# Patient Record
Sex: MALE | Race: WHITE | NOT HISPANIC OR LATINO | Employment: OTHER | URBAN - METROPOLITAN AREA
[De-identification: names, ages, dates, MRNs, and addresses within clinical notes are randomized per-mention and may not be internally consistent; named-entity substitution may affect disease eponyms.]

---

## 2023-03-30 DIAGNOSIS — M10.9 GOUT, UNSPECIFIED CAUSE, UNSPECIFIED CHRONICITY, UNSPECIFIED SITE: Primary | ICD-10-CM

## 2023-03-30 RX ORDER — ALLOPURINOL 100 MG/1
100 TABLET ORAL DAILY
Qty: 90 TABLET | Refills: 2 | Status: SHIPPED | OUTPATIENT
Start: 2023-03-30 | End: 2023-12-26

## 2023-08-04 ENCOUNTER — OFFICE VISIT (OUTPATIENT)
Dept: PRIMARY CARE | Facility: CLINIC | Age: 68
End: 2023-08-04
Payer: MEDICARE

## 2023-08-04 VITALS
DIASTOLIC BLOOD PRESSURE: 105 MMHG | HEIGHT: 70 IN | HEART RATE: 87 BPM | WEIGHT: 213 LBS | SYSTOLIC BLOOD PRESSURE: 177 MMHG | BODY MASS INDEX: 30.49 KG/M2 | OXYGEN SATURATION: 93 %

## 2023-08-04 DIAGNOSIS — R05.3 CHRONIC COUGH: ICD-10-CM

## 2023-08-04 DIAGNOSIS — I10 HTN (HYPERTENSION), BENIGN: Primary | ICD-10-CM

## 2023-08-04 PROCEDURE — 99213 OFFICE O/P EST LOW 20 MIN: CPT | Performed by: FAMILY MEDICINE

## 2023-08-04 PROCEDURE — 3080F DIAST BP >= 90 MM HG: CPT | Performed by: FAMILY MEDICINE

## 2023-08-04 PROCEDURE — 3077F SYST BP >= 140 MM HG: CPT | Performed by: FAMILY MEDICINE

## 2023-08-04 PROCEDURE — 1157F ADVNC CARE PLAN IN RCRD: CPT | Performed by: FAMILY MEDICINE

## 2023-08-04 RX ORDER — BENZONATATE 100 MG/1
100 CAPSULE ORAL 3 TIMES DAILY PRN
Qty: 42 CAPSULE | Refills: 0 | Status: SHIPPED | OUTPATIENT
Start: 2023-08-04 | End: 2023-08-15 | Stop reason: SDUPTHER

## 2023-08-04 RX ORDER — POLYMYXIN B SULFATE AND TRIMETHOPRIM 1; 10000 MG/ML; [USP'U]/ML
SOLUTION OPHTHALMIC
COMMUNITY
Start: 2023-03-06 | End: 2023-12-19 | Stop reason: WASHOUT

## 2023-08-04 RX ORDER — LEVOFLOXACIN 500 MG/1
TABLET, FILM COATED ORAL
COMMUNITY
Start: 2022-12-13 | End: 2023-12-19 | Stop reason: WASHOUT

## 2023-08-04 RX ORDER — ASPIRIN 81 MG/1
1 TABLET ORAL DAILY
COMMUNITY

## 2023-08-04 RX ORDER — MULTIVIT-MIN/IRON/FOLIC ACID/K 18-600-40
CAPSULE ORAL
COMMUNITY

## 2023-08-04 RX ORDER — PREDNISOLONE ACETATE 10 MG/ML
SUSPENSION/ DROPS OPHTHALMIC
COMMUNITY
Start: 2021-10-08 | End: 2023-12-19 | Stop reason: WASHOUT

## 2023-08-04 RX ORDER — FLUTICASONE PROPIONATE AND SALMETEROL 250; 50 UG/1; UG/1
1 POWDER RESPIRATORY (INHALATION) EVERY 12 HOURS
COMMUNITY
Start: 2022-12-13 | End: 2023-12-19 | Stop reason: WASHOUT

## 2023-08-04 RX ORDER — LISINOPRIL AND HYDROCHLOROTHIAZIDE 12.5; 2 MG/1; MG/1
1 TABLET ORAL DAILY
COMMUNITY
Start: 2022-03-11 | End: 2023-08-04 | Stop reason: SINTOL

## 2023-08-04 RX ORDER — VALSARTAN AND HYDROCHLOROTHIAZIDE 80; 12.5 MG/1; MG/1
1 TABLET, FILM COATED ORAL DAILY
Qty: 90 TABLET | Refills: 1 | Status: SHIPPED | OUTPATIENT
Start: 2023-08-04 | End: 2024-01-16

## 2023-08-04 RX ORDER — ATORVASTATIN CALCIUM 40 MG/1
1 TABLET, FILM COATED ORAL NIGHTLY
COMMUNITY
Start: 2020-11-27 | End: 2024-05-29

## 2023-08-04 ASSESSMENT — ENCOUNTER SYMPTOMS
SHORTNESS OF BREATH: 1
RHINORRHEA: 1
CHILLS: 0
COUGH: 1

## 2023-08-04 NOTE — PROGRESS NOTES
Answers submitted by the patient for this visit:  Cough Questionnaire (Submitted on 8/4/2023)  Chief Complaint: Cough  Chronicity: new  Onset: more than 1 month ago  Progression since onset: waxing and waning  Frequency: every few hours  Cough characteristics: productive of sputum  chest pain: No  chills: No  ear congestion: No  ear pain: Yes  postnasal drip: Yes  rash: Yes  rhinorrhea: Yes  shortness of breath: Yes  Aggravated by: nothing

## 2023-08-04 NOTE — PROGRESS NOTES
Subjective   Patient ID: Easton Ragland is a 68 y.o. male.    Cough  This is a new problem. The current episode started more than 1 month ago. The problem has been waxing and waning. The problem occurs every few hours. The cough is Productive of sputum. Associated symptoms include ear pain, postnasal drip, a rash, rhinorrhea and shortness of breath. Pertinent negatives include no chest pain, chills or ear congestion. Nothing aggravates the symptoms.     Cough congestion patient has chronic recurrent cough no fever no chills no evidence of recent infection.  Might be due to his lisinopril we will going to change medications around little bit see if that helps with his cough also needs to be worked up for gastroesophageal reflux  Review of Systems   Constitutional:  Negative for chills.   HENT:  Positive for ear pain, postnasal drip and rhinorrhea.    Respiratory:  Positive for cough and shortness of breath.    Cardiovascular:  Negative for chest pain.   Skin:  Positive for rash.   All other systems reviewed and are negative.      Objective   Physical Exam  General no acute process no icterus well-hydrated alert active oriented    HEENT normocephalic no palpable tenderness eyes pupils equal reactive light and accommodation extraocular muscles intact no icterus and/or erythema ears benign external auditory canal no gross deformities nose no discharge drainage erythema bleeding throat no erythema.    Heart regular rate and rhythm without S3-S4 or murmur    Lungs clear to auscultation x2 no rales or rhonchi    Abdomen soft nontender nondistended no palpable masses no organomegaly splenomegaly.    Integument no rash no lumps bumps or concerning lesions.    Neurologic no tics tremors or seizures no decreased range of motion or ataxia.    Musculoskeletal good range of motion no gross abnormalities noted  Assessment/Plan   There are no diagnoses linked to this encounter.

## 2023-08-08 DIAGNOSIS — R05.3 CHRONIC COUGH: ICD-10-CM

## 2023-08-11 ASSESSMENT — ENCOUNTER SYMPTOMS
COUGH: 1
SHORTNESS OF BREATH: 1
RHINORRHEA: 1
CHILLS: 0

## 2023-08-15 RX ORDER — BENZONATATE 100 MG/1
100 CAPSULE ORAL 3 TIMES DAILY PRN
Qty: 42 CAPSULE | Refills: 0 | Status: SHIPPED | OUTPATIENT
Start: 2023-08-15 | End: 2023-09-14

## 2023-08-31 PROBLEM — I73.9 PERIPHERAL VASCULAR DISEASE (CMS-HCC): Status: ACTIVE | Noted: 2023-08-31

## 2023-08-31 PROBLEM — I42.0 CARDIOMYOPATHY, DILATED (MULTI): Status: ACTIVE | Noted: 2023-08-31

## 2023-08-31 PROBLEM — M17.9 OSTEOARTHRITIS OF KNEE: Status: ACTIVE | Noted: 2023-08-31

## 2023-08-31 PROBLEM — G80.9 CEREBRAL PALSY (MULTI): Status: ACTIVE | Noted: 2023-08-31

## 2023-08-31 PROBLEM — I25.10 CAD (CORONARY ARTERY DISEASE): Status: ACTIVE | Noted: 2023-08-31

## 2023-08-31 PROBLEM — E78.00 HYPERCHOLESTEROLEMIA: Status: ACTIVE | Noted: 2023-08-31

## 2023-08-31 PROBLEM — I10 BENIGN ESSENTIAL HTN: Status: ACTIVE | Noted: 2023-08-31

## 2023-08-31 PROBLEM — I65.29 STENOSIS OF CAROTID ARTERY: Status: ACTIVE | Noted: 2023-08-31

## 2023-08-31 PROBLEM — F17.200 SMOKES TOBACCO DAILY: Status: ACTIVE | Noted: 2023-08-31

## 2023-08-31 PROBLEM — I82.409 DVT (DEEP VENOUS THROMBOSIS) (MULTI): Status: ACTIVE | Noted: 2023-08-31

## 2023-08-31 PROBLEM — H90.3 ASYMMETRICAL SENSORINEURAL HEARING LOSS: Status: ACTIVE | Noted: 2021-08-10

## 2023-08-31 PROBLEM — R06.02 SOB (SHORTNESS OF BREATH) ON EXERTION: Status: ACTIVE | Noted: 2023-08-31

## 2023-08-31 RX ORDER — LISINOPRIL AND HYDROCHLOROTHIAZIDE 12.5; 2 MG/1; MG/1
TABLET ORAL
COMMUNITY
Start: 2023-05-07 | End: 2023-12-19 | Stop reason: WASHOUT

## 2023-10-09 ENCOUNTER — HOSPITAL ENCOUNTER (OUTPATIENT)
Dept: CARDIOLOGY | Facility: CLINIC | Age: 68
Discharge: HOME | End: 2023-10-09
Payer: MEDICARE

## 2023-10-09 ENCOUNTER — APPOINTMENT (OUTPATIENT)
Dept: RADIOLOGY | Facility: CLINIC | Age: 68
End: 2023-10-09
Payer: MEDICARE

## 2023-10-09 ENCOUNTER — ANCILLARY PROCEDURE (OUTPATIENT)
Dept: RADIOLOGY | Facility: CLINIC | Age: 68
End: 2023-10-09
Payer: MEDICARE

## 2023-10-09 DIAGNOSIS — I25.10 CAD (CORONARY ARTERY DISEASE): Primary | ICD-10-CM

## 2023-10-09 DIAGNOSIS — I25.10 ATHEROSCLEROTIC HEART DISEASE OF NATIVE CORONARY ARTERY WITHOUT ANGINA PECTORIS: Primary | ICD-10-CM

## 2023-10-09 DIAGNOSIS — I42.0 DILATED CARDIOMYOPATHY (MULTI): ICD-10-CM

## 2023-10-09 DIAGNOSIS — I25.10 ATHEROSCLEROTIC HEART DISEASE OF NATIVE CORONARY ARTERY WITHOUT ANGINA PECTORIS: ICD-10-CM

## 2023-10-09 DIAGNOSIS — I10 ESSENTIAL (PRIMARY) HYPERTENSION: ICD-10-CM

## 2023-10-09 PROCEDURE — 93306 TTE W/DOPPLER COMPLETE: CPT

## 2023-10-09 PROCEDURE — 78451 HT MUSCLE IMAGE SPECT SING: CPT

## 2023-10-09 PROCEDURE — 93017 CV STRESS TEST TRACING ONLY: CPT

## 2023-10-09 PROCEDURE — 78452 HT MUSCLE IMAGE SPECT MULT: CPT | Performed by: INTERNAL MEDICINE

## 2023-10-09 PROCEDURE — 93306 TTE W/DOPPLER COMPLETE: CPT | Performed by: INTERNAL MEDICINE

## 2023-10-09 PROCEDURE — 93016 CV STRESS TEST SUPVJ ONLY: CPT | Performed by: INTERNAL MEDICINE

## 2023-10-11 LAB — EJECTION FRACTION APICAL 4 CHAMBER: 64.8

## 2023-10-23 ENCOUNTER — OFFICE VISIT (OUTPATIENT)
Dept: OTOLARYNGOLOGY | Facility: CLINIC | Age: 68
End: 2023-10-23
Payer: MEDICARE

## 2023-10-23 VITALS
DIASTOLIC BLOOD PRESSURE: 88 MMHG | HEART RATE: 72 BPM | BODY MASS INDEX: 28.35 KG/M2 | WEIGHT: 198 LBS | SYSTOLIC BLOOD PRESSURE: 148 MMHG | HEIGHT: 70 IN | TEMPERATURE: 98 F

## 2023-10-23 DIAGNOSIS — J02.9 SORE THROAT: Primary | ICD-10-CM

## 2023-10-23 DIAGNOSIS — J34.89 NASAL DRAINAGE: ICD-10-CM

## 2023-10-23 DIAGNOSIS — K21.9 LARYNGOPHARYNGEAL REFLUX (LPR): ICD-10-CM

## 2023-10-23 DIAGNOSIS — H91.90 HEARING LOSS, UNSPECIFIED HEARING LOSS TYPE, UNSPECIFIED LATERALITY: ICD-10-CM

## 2023-10-23 PROCEDURE — 1159F MED LIST DOCD IN RCRD: CPT | Performed by: NURSE PRACTITIONER

## 2023-10-23 PROCEDURE — 3079F DIAST BP 80-89 MM HG: CPT | Performed by: NURSE PRACTITIONER

## 2023-10-23 PROCEDURE — 3077F SYST BP >= 140 MM HG: CPT | Performed by: NURSE PRACTITIONER

## 2023-10-23 PROCEDURE — 99213 OFFICE O/P EST LOW 20 MIN: CPT | Performed by: NURSE PRACTITIONER

## 2023-10-23 PROCEDURE — 1126F AMNT PAIN NOTED NONE PRSNT: CPT | Performed by: NURSE PRACTITIONER

## 2023-10-23 ASSESSMENT — COLUMBIA-SUICIDE SEVERITY RATING SCALE - C-SSRS
6. HAVE YOU EVER DONE ANYTHING, STARTED TO DO ANYTHING, OR PREPARED TO DO ANYTHING TO END YOUR LIFE?: NO
2. HAVE YOU ACTUALLY HAD ANY THOUGHTS OF KILLING YOURSELF?: NO
1. IN THE PAST MONTH, HAVE YOU WISHED YOU WERE DEAD OR WISHED YOU COULD GO TO SLEEP AND NOT WAKE UP?: NO

## 2023-10-23 ASSESSMENT — PATIENT HEALTH QUESTIONNAIRE - PHQ9
1. LITTLE INTEREST OR PLEASURE IN DOING THINGS: NOT AT ALL
SUM OF ALL RESPONSES TO PHQ9 QUESTIONS 1 AND 2: 0
2. FEELING DOWN, DEPRESSED OR HOPELESS: NOT AT ALL

## 2023-10-23 ASSESSMENT — ENCOUNTER SYMPTOMS
LOSS OF SENSATION IN FEET: 0
OCCASIONAL FEELINGS OF UNSTEADINESS: 0
DEPRESSION: 0

## 2023-10-23 ASSESSMENT — PAIN SCALES - GENERAL: PAINLEVEL: 0-NO PAIN

## 2023-10-23 NOTE — PROGRESS NOTES
"Subjective   Patient ID: Easton Ragland is a 68 y.o. male who presents for Sore Throat (Follow up ).    HPI    INITIAL VISIT 9/5/2023:  Easton Ragland is a 68 year-old male here for evaluation of his throat. He feels he has a \"hairball\" in his throat that started about 1 week ago. When he would clear his throat it would be sore. He used Chloraseptic. Symptoms have got better over the last few days. It's worse at night. He has a history of chronic sinus disease, has had surgeries in the past. He has allergies which he takes Mucinex and nasal sprays. - hasn't used it in a couple weeks prior. Now he is using his sinus medications. Has not had a sinus infection in years.   Dry cough for a couple of months, on Lisinopril and switched to Valsartan, does feel cough has improved some. Taking benzonatate as well. No acid reflux or heartburn. His voice is raspy, worse throughout the day. No trouble swallowing. Tea helps. Cigar smoker, cut way back- maybe one every 2 months.     10/23/2023: Patient following up for his throat. Feels medication has worked. Hard to take it before biggest meal, wasn't using it consistently after the first couple of weeks. He does feel the throat is much improved. No complaints today.  Has allergies, tested years ago. Takes Zyrtec daily- wondering if there is a better antihistamine. Uses Flonase most days. Uses sinus rinses if nasal drainage becomes discolored- admits to using tap water.   Had an episode where the hearing in his left ear was decreased for about 1 week. Resolved now. Does have hearing loss, last test was ~ 3 years ago through [facility]. Tried hearing aids but doesn't feel they work well.     Past Medical History:   Diagnosis Date    Personal history of other diseases of the circulatory system     History of coronary atherosclerosis    Personal history of other diseases of the musculoskeletal system and connective tissue     History of gout     Past Surgical History:   Procedure Laterality " Date    OTHER SURGICAL HISTORY  12/06/2019    Tonsillectomy with adenoidectomy    OTHER SURGICAL HISTORY  12/06/2019    Knee replacement     Review of Systems    All other systems have been reviewed and are negative for complaints except for those mentioned in history of present illness, past medical history and problem list.    Objective   Physical Exam    Constitutional: No fever, chills, weight loss or weight gain  General appearance: Appears well, well-nourished, well groomed. No acute distress.    Communication: Normal communication    Psychiatric: Oriented to person, place and time. Normal mood and affect.    Neurologic: Cranial nerves II-XII grossly intact and symmetric bilaterally.    Head and Face:  Head: Atraumatic with no masses, lesions or scarring.  Face: Normal symmetry. No scars or deformities.  TMJ: Normal, no trismus.    Eyes: Conjunctiva not edematous or erythematous.     Right Ear: External inspection of ear with no deformity, scars, or masses. EAC is clear.  TM is intact with no sign of infection, effusion, or retraction.  No perforation seen.     Left Ear: External inspection of ear with no deformity, scars, or masses. EAC is clear.  TM is intact with no sign of infection, effusion, or retraction.  No perforation seen.     Nose: External inspection of nose: No nasal lesions, lacerations or scars. Anterior rhinoscopy with limited visualization past the inferior turbinates. No tenderness on frontal or maxillary sinus palpation.    Oral Cavity/Mouth: Oral cavity and oropharynx mucosa moist and pink. No lesions or masses. Dentition normal. Tonsils appear normal. Uvula is midline. Tongue with no masses or lesions. Tongue with good mobility. The oropharynx is clear.    Neck: Normal appearing, symmetric, trachea midline.     Cardiovascular: Examination of peripheral vascular system shows no clubbing or cyanosis.    Respiratory: No respiratory distress increased work of breathing. Inspection of the  chest with symmetric chest expansion and normal respiratory effort.    Skin: No head and neck rashes.    Lymph nodes: No adenopathy.     Assessment/Plan   Diagnoses and all orders for this visit:  Sore throat  Laryngopharyngeal reflux (LPR)  Discontinue Omeprazole at this time. Continue dietary/lifestyle measures for reflux. If throat symptom return, we will likely refer to GI for further evaluation of reflux, likely EGD.   Hearing loss, unspecified hearing loss type, unspecified laterality  Recommend updating audiogram. If he develops hearing loss again, call the office.   Nasal drainage  Switch from Zyrtec to Xyzal. Continue Flonase. Increase sinus rinses- use distilled water.     Patient may follow up with me as needed.  All questions answered to patient satisfaction.

## 2023-10-29 DIAGNOSIS — M54.31 BILATERAL SCIATICA: Primary | ICD-10-CM

## 2023-10-29 DIAGNOSIS — M54.32 BILATERAL SCIATICA: Primary | ICD-10-CM

## 2023-11-13 ENCOUNTER — PATIENT MESSAGE (OUTPATIENT)
Dept: OTOLARYNGOLOGY | Facility: CLINIC | Age: 68
End: 2023-11-13
Payer: MEDICARE

## 2023-11-13 DIAGNOSIS — K21.9 LARYNGOPHARYNGEAL REFLUX (LPR): Primary | ICD-10-CM

## 2023-11-13 RX ORDER — OMEPRAZOLE 40 MG/1
CAPSULE, DELAYED RELEASE ORAL
Qty: 90 CAPSULE | Refills: 3 | Status: SHIPPED | OUTPATIENT
Start: 2023-11-13

## 2023-12-19 ENCOUNTER — OFFICE VISIT (OUTPATIENT)
Dept: PRIMARY CARE | Facility: CLINIC | Age: 68
End: 2023-12-19
Payer: MEDICARE

## 2023-12-19 ENCOUNTER — LAB (OUTPATIENT)
Dept: LAB | Facility: LAB | Age: 68
End: 2023-12-19
Payer: MEDICARE

## 2023-12-19 VITALS
DIASTOLIC BLOOD PRESSURE: 77 MMHG | WEIGHT: 202 LBS | BODY MASS INDEX: 28.92 KG/M2 | SYSTOLIC BLOOD PRESSURE: 160 MMHG | OXYGEN SATURATION: 97 % | HEART RATE: 72 BPM | HEIGHT: 70 IN

## 2023-12-19 DIAGNOSIS — I25.83 CORONARY ARTERY DISEASE DUE TO LIPID RICH PLAQUE: Primary | ICD-10-CM

## 2023-12-19 DIAGNOSIS — I25.10 CORONARY ARTERY DISEASE DUE TO LIPID RICH PLAQUE: ICD-10-CM

## 2023-12-19 DIAGNOSIS — I25.10 CORONARY ARTERY DISEASE DUE TO LIPID RICH PLAQUE: Primary | ICD-10-CM

## 2023-12-19 DIAGNOSIS — I25.83 CORONARY ARTERY DISEASE DUE TO LIPID RICH PLAQUE: ICD-10-CM

## 2023-12-19 DIAGNOSIS — I10 BENIGN ESSENTIAL HTN: ICD-10-CM

## 2023-12-19 LAB
ALBUMIN SERPL BCP-MCNC: 4.6 G/DL (ref 3.4–5)
ALP SERPL-CCNC: 62 U/L (ref 33–136)
ALT SERPL W P-5'-P-CCNC: 29 U/L (ref 10–52)
ANION GAP SERPL CALC-SCNC: 13 MMOL/L (ref 10–20)
AST SERPL W P-5'-P-CCNC: 24 U/L (ref 9–39)
BILIRUB SERPL-MCNC: 1 MG/DL (ref 0–1.2)
BUN SERPL-MCNC: 13 MG/DL (ref 6–23)
CALCIUM SERPL-MCNC: 9.9 MG/DL (ref 8.6–10.6)
CHLORIDE SERPL-SCNC: 104 MMOL/L (ref 98–107)
CHOLEST SERPL-MCNC: 182 MG/DL (ref 0–199)
CHOLESTEROL/HDL RATIO: 2.5
CO2 SERPL-SCNC: 30 MMOL/L (ref 21–32)
CREAT SERPL-MCNC: 1.1 MG/DL (ref 0.5–1.3)
GFR SERPL CREATININE-BSD FRML MDRD: 72 ML/MIN/1.73M*2
GLUCOSE SERPL-MCNC: 116 MG/DL (ref 74–99)
HDLC SERPL-MCNC: 71.7 MG/DL
LDLC SERPL CALC-MCNC: 87 MG/DL
NON HDL CHOLESTEROL: 110 MG/DL (ref 0–149)
POTASSIUM SERPL-SCNC: 4.5 MMOL/L (ref 3.5–5.3)
PROT SERPL-MCNC: 7.1 G/DL (ref 6.4–8.2)
SODIUM SERPL-SCNC: 142 MMOL/L (ref 136–145)
TRIGL SERPL-MCNC: 116 MG/DL (ref 0–149)
VLDL: 23 MG/DL (ref 0–40)

## 2023-12-19 PROCEDURE — 36415 COLL VENOUS BLD VENIPUNCTURE: CPT

## 2023-12-19 PROCEDURE — 3078F DIAST BP <80 MM HG: CPT | Performed by: FAMILY MEDICINE

## 2023-12-19 PROCEDURE — 3077F SYST BP >= 140 MM HG: CPT | Performed by: FAMILY MEDICINE

## 2023-12-19 PROCEDURE — 1159F MED LIST DOCD IN RCRD: CPT | Performed by: FAMILY MEDICINE

## 2023-12-19 PROCEDURE — 1170F FXNL STATUS ASSESSED: CPT | Performed by: FAMILY MEDICINE

## 2023-12-19 PROCEDURE — 80053 COMPREHEN METABOLIC PANEL: CPT

## 2023-12-19 PROCEDURE — G0439 PPPS, SUBSEQ VISIT: HCPCS | Performed by: FAMILY MEDICINE

## 2023-12-19 PROCEDURE — 1126F AMNT PAIN NOTED NONE PRSNT: CPT | Performed by: FAMILY MEDICINE

## 2023-12-19 PROCEDURE — 99214 OFFICE O/P EST MOD 30 MIN: CPT | Performed by: FAMILY MEDICINE

## 2023-12-19 PROCEDURE — G0444 DEPRESSION SCREEN ANNUAL: HCPCS | Performed by: FAMILY MEDICINE

## 2023-12-19 PROCEDURE — 80061 LIPID PANEL: CPT

## 2023-12-19 ASSESSMENT — ENCOUNTER SYMPTOMS
ARTHRALGIAS: 1
GASTROINTESTINAL NEGATIVE: 1
NEUROLOGICAL NEGATIVE: 1
JOINT SWELLING: 1
RESPIRATORY NEGATIVE: 1
CONSTITUTIONAL NEGATIVE: 1
CARDIOVASCULAR NEGATIVE: 1

## 2023-12-19 ASSESSMENT — ACTIVITIES OF DAILY LIVING (ADL)
TAKING_MEDICATION: INDEPENDENT
DRESSING: INDEPENDENT
MANAGING_FINANCES: INDEPENDENT
BATHING: INDEPENDENT
GROCERY_SHOPPING: INDEPENDENT
DOING_HOUSEWORK: INDEPENDENT

## 2023-12-19 ASSESSMENT — PATIENT HEALTH QUESTIONNAIRE - PHQ9
SUM OF ALL RESPONSES TO PHQ9 QUESTIONS 1 AND 2: 0
2. FEELING DOWN, DEPRESSED OR HOPELESS: NOT AT ALL
1. LITTLE INTEREST OR PLEASURE IN DOING THINGS: NOT AT ALL

## 2023-12-19 NOTE — PROGRESS NOTES
Subjective   Patient ID: Easton Ragland is a 68 y.o. male who presents for Medicare Annual Wellness Visit Subsequent.  HPI  Patient with periodic arthralgias and joint pain.  Patient also complains of intermittent allergies and breathing difficulties  Review of Systems   Constitutional: Negative.    HENT: Negative.     Respiratory: Negative.     Cardiovascular: Negative.    Gastrointestinal: Negative.    Genitourinary: Negative.    Musculoskeletal:  Positive for arthralgias and joint swelling.   Neurological: Negative.        Objective   Physical Exam  Constitutional:       Appearance: Normal appearance.   HENT:      Head: Normocephalic and atraumatic.      Nose: Nose normal.   Eyes:      Extraocular Movements: Extraocular movements intact.      Pupils: Pupils are equal, round, and reactive to light.   Cardiovascular:      Rate and Rhythm: Normal rate and regular rhythm.   Pulmonary:      Effort: Pulmonary effort is normal.      Breath sounds: Normal breath sounds.   Musculoskeletal:         General: Tenderness present.   Neurological:      Mental Status: He is alert.       Assessment/Plan   Problem List Items Addressed This Visit             ICD-10-CM    CAD (coronary artery disease) - Primary I25.10    Relevant Orders    Comprehensive Metabolic Panel    Lipid Panel            Paolo Zhu DO 12/19/23 10:47 AM

## 2024-02-02 ENCOUNTER — TELEMEDICINE (OUTPATIENT)
Dept: PRIMARY CARE | Facility: CLINIC | Age: 69
End: 2024-02-02
Payer: MEDICARE

## 2024-02-02 DIAGNOSIS — U07.1 COVID-19: Primary | ICD-10-CM

## 2024-02-02 PROCEDURE — 99441 PR PHYS/QHP TELEPHONE EVALUATION 5-10 MIN: CPT | Performed by: FAMILY MEDICINE

## 2024-02-02 RX ORDER — NIRMATRELVIR AND RITONAVIR 300-100 MG
3 KIT ORAL 2 TIMES DAILY
Qty: 30 TABLET | Refills: 0 | Status: SHIPPED | OUTPATIENT
Start: 2024-02-02 | End: 2024-02-07

## 2024-02-02 NOTE — PROGRESS NOTES
Subjective   Patient ID: Easton Ragland is a 68 y.o. male who presents for No chief complaint on file..  HPI  Developed covid started weds had chills myalgias patient has a fever cough and congestion.  After doing a COVID test patient states it was positive he did a recheck it was also positive.  Patient's had a for about a day and a half does not seem to be getting better we talked about treatment alternatives we talked about using Paxlovid patient's got well-established stable kidney functions after discussing different medications and patient decided to go with Paxlovid.    Spent 6 minutes talking to the patient  Review of Systems    Objective   Physical Exam    Assessment/Plan            Paolo Zhu,  02/02/24 12:13 PM

## 2024-02-27 PROBLEM — K21.9 LARYNGOPHARYNGEAL REFLUX: Status: ACTIVE | Noted: 2024-02-27

## 2024-02-27 PROBLEM — R09.A2 SENSATION OF LUMP IN THROAT: Status: ACTIVE | Noted: 2024-02-27

## 2024-02-27 PROBLEM — J02.9 SORE THROAT: Status: ACTIVE | Noted: 2024-02-27

## 2024-02-27 PROBLEM — R49.9 CHANGE OF VOICE: Status: ACTIVE | Noted: 2024-02-27

## 2024-02-27 PROBLEM — J20.9 ACUTE BRONCHITIS: Status: ACTIVE | Noted: 2024-02-27

## 2024-02-27 PROBLEM — I42.0 CARDIOMYOPATHY, DILATED (MULTI): Chronic | Status: ACTIVE | Noted: 2023-08-31

## 2024-02-27 PROBLEM — E78.00 HYPERCHOLESTEROLEMIA: Chronic | Status: ACTIVE | Noted: 2023-08-31

## 2024-02-27 PROBLEM — F17.200 SMOKES TOBACCO DAILY: Status: RESOLVED | Noted: 2023-08-31 | Resolved: 2024-02-27

## 2024-02-27 PROBLEM — R05.3 CHRONIC COUGH: Status: ACTIVE | Noted: 2024-02-27

## 2024-02-27 PROBLEM — I65.29 STENOSIS OF CAROTID ARTERY: Chronic | Status: ACTIVE | Noted: 2023-08-31

## 2024-02-27 PROBLEM — R60.0 EDEMA OF LOWER EXTREMITY: Status: ACTIVE | Noted: 2024-02-27

## 2024-02-27 PROBLEM — R10.11 RIGHT UPPER QUADRANT ABDOMINAL PAIN: Status: ACTIVE | Noted: 2024-02-27

## 2024-02-27 PROBLEM — I82.409 DVT (DEEP VENOUS THROMBOSIS) (MULTI): Chronic | Status: ACTIVE | Noted: 2023-08-31

## 2024-02-27 PROBLEM — M72.0 DUPUYTREN'S CONTRACTURE: Status: ACTIVE | Noted: 2024-02-27

## 2024-02-27 PROBLEM — I25.10 CAD (CORONARY ARTERY DISEASE): Chronic | Status: ACTIVE | Noted: 2023-08-31

## 2024-02-27 PROBLEM — H93.13 TINNITUS OF BOTH EARS: Status: ACTIVE | Noted: 2021-08-10

## 2024-02-27 PROBLEM — M10.9 GOUT: Status: ACTIVE | Noted: 2024-02-27

## 2024-02-27 PROBLEM — G80.9 CEREBRAL PALSY (MULTI): Status: RESOLVED | Noted: 2023-08-31 | Resolved: 2024-02-27

## 2024-02-27 PROBLEM — R07.81 RIB PAIN: Status: ACTIVE | Noted: 2024-02-27

## 2024-03-04 DIAGNOSIS — K21.9 LARYNGOPHARYNGEAL REFLUX (LPR): Primary | ICD-10-CM

## 2024-03-04 PROBLEM — H90.3 ASYMMETRICAL SENSORINEURAL HEARING LOSS: Status: RESOLVED | Noted: 2021-08-10 | Resolved: 2024-03-04

## 2024-03-04 PROBLEM — J02.9 SORE THROAT: Status: RESOLVED | Noted: 2024-02-27 | Resolved: 2024-03-04

## 2024-03-04 PROBLEM — M17.9 OSTEOARTHRITIS OF KNEE: Status: RESOLVED | Noted: 2023-08-31 | Resolved: 2024-03-04

## 2024-03-04 PROBLEM — J20.9 ACUTE BRONCHITIS: Status: RESOLVED | Noted: 2024-02-27 | Resolved: 2024-03-04

## 2024-03-04 PROBLEM — R05.3 CHRONIC COUGH: Status: RESOLVED | Noted: 2024-02-27 | Resolved: 2024-03-04

## 2024-03-04 PROBLEM — M10.9 GOUT: Status: RESOLVED | Noted: 2024-02-27 | Resolved: 2024-03-04

## 2024-03-04 PROBLEM — R10.11 RIGHT UPPER QUADRANT ABDOMINAL PAIN: Status: RESOLVED | Noted: 2024-02-27 | Resolved: 2024-03-04

## 2024-03-04 PROBLEM — I10 BENIGN ESSENTIAL HYPERTENSION: Chronic | Status: ACTIVE | Noted: 2023-08-31

## 2024-03-04 PROBLEM — H93.13 TINNITUS OF BOTH EARS: Status: RESOLVED | Noted: 2021-08-10 | Resolved: 2024-03-04

## 2024-03-04 PROBLEM — R07.81 RIB PAIN: Status: RESOLVED | Noted: 2024-02-27 | Resolved: 2024-03-04

## 2024-03-04 PROBLEM — R09.A2 SENSATION OF LUMP IN THROAT: Status: RESOLVED | Noted: 2024-02-27 | Resolved: 2024-03-04

## 2024-03-04 PROBLEM — R49.9 CHANGE OF VOICE: Status: RESOLVED | Noted: 2024-02-27 | Resolved: 2024-03-04

## 2024-03-04 NOTE — PROGRESS NOTES
Patient's symptoms resolve with PPI.   Would like to refer to GI. Order placed. All questions answered.

## 2024-03-04 NOTE — PROGRESS NOTES
Referred by No ref. provider found    HPI Feeling well. No CP/SOB. Had knee/sciatica issues. Wt up 15# from 10/2023.     Past Medical History:  Problem List Items Addressed This Visit    None       Past Medical History:   Diagnosis Date    CAD (coronary artery disease) 08/31/2023    Elevated  Agatston units    Cardiomyopathy, dilated (CMS/HCC) 08/31/2023    Mildly reduced on stress 49% ... NL on resting echo    DVT (deep venous thrombosis) (CMS/HCC) 08/31/2023 2014 ... appears to be unprovoked    Hypercholesterolemia 08/31/2023    Dr. Zhu follows    Personal history of other diseases of the circulatory system     History of coronary atherosclerosis    Personal history of other diseases of the musculoskeletal system and connective tissue     History of gout    Stenosis of carotid artery 08/31/2023    mild B/L             Past Surgical History:  He has a past surgical history that includes Other surgical history (12/06/2019) and Other surgical history (12/06/2019).      Social History:  He reports that he has been smoking cigars. He has never used smokeless tobacco. He reports current alcohol use of about 6.0 standard drinks of alcohol per week. He reports current drug use. Frequency: 2.00 times per week.    Family History:  No family history on file.     Allergies:  Penicillins, Clarithromycin, and Bee pollen    Outpatient Medications:  Current Outpatient Medications   Medication Instructions    allopurinol (ZYLOPRIM) 100 mg, oral, Daily    ascorbic acid, vitamin C, 500 mg capsule oral    aspirin 81 mg EC tablet 1 tablet, oral, Daily    atorvastatin (Lipitor) 40 mg tablet 1 tablet, oral, Nightly    docosahexaenoic acid/epa (FISH OIL ORAL) Fish Oil OIL   Refills: 0       Active    glucosamine/chondr ambrose A sod (OSTEO BI-FLEX ORAL) oral    multivitamin (MULTIPLE VITAMINS ORAL) oral, Daily    NON FORMULARY Herbal Supplement     omeprazole (PriLOSEC) 40 mg DR capsule Take 1 capsule by mouth 30 minutes before  "your biggest meal, or before breakfast daily. Do not crush or chew.    valsartan-hydrochlorothiazide (Diovan-HCT) 80-12.5 mg tablet 1 tablet, oral, Daily        Last Recorded Vitals:  There were no vitals filed for this visit.    Physical Exam    Physical  Patient is alert and oriented x3.  HEENT is unremarkable mucous members are moist  Neck no JVP no bruits upstrokes are full no thyromegaly  Lungs are clear bilaterally.  No wheezing crackles or rales  Heart regular rhythm normal S1-S2 there is no S3 no murmurs are heard.  Abdomen is soft vessels are positive nontender nondistended no organomegaly no pulsatile masses  Extremities have no edema.  Distal pulses present palpable.  Neuro is grossly nonfocal  Skin has no rashes     Last Labs:  CBC -  No results found for: \"WBC\", \"HGB\", \"HCT\", \"MCV\", \"PLT\"    CMP -  Lab Results   Component Value Date    CALCIUM 9.9 12/19/2023    PROT 7.1 12/19/2023    ALBUMIN 4.6 12/19/2023    AST 24 12/19/2023    ALT 29 12/19/2023    ALKPHOS 62 12/19/2023    BILITOT 1.0 12/19/2023       LIPID PANEL -   Lab Results   Component Value Date    CHOL 182 12/19/2023    HDL 71.7 12/19/2023    CHHDL 2.5 12/19/2023    VLDL 23 12/19/2023    TRIG 116 12/19/2023    NHDL 110 12/19/2023       RENAL FUNCTION PANEL -   Lab Results   Component Value Date    K 4.5 12/19/2023       No results found for: \"BNP\", \"HGBA1C\"  Procedure    TST 10/9/2023 negative EKG, below average capacity, frequent PVCs, no symptoms, nuclear images normal ejection fraction 60%    Echo 10/9/2023 EF 55 to 60%, DDF    AA U/S [01/11/2021]: No evidence of aneurysm.     ECHO [07/07/2020]: EF 55-60%. SD = impaired relaxation pattern. Aorta mildly dial (3.9 cm). Atrial septal aneurysm present.     NST [02/05/2020]: Normal. There is mildly reduced LVSF w/ex EF 49%.     CAC (11/13/2019) score 594 aga units. Severe disease. Extensive calcified plaque w/in Prox and Mid L anterior descending coronary artery.      CAROTID (05/21/2015) [place] & " [place] 16-49% stenosis          Assessment/Plan   1. CAD. Elevated calcium score 594 Agatston units. No EKG today due to BL pink eye.  Continue with aspirin atorvastatin and lisinopril.  Exercise nuclear stress test 10/9/2023 below average capacity, no symptoms, normal EKG, frequent PVCs, nuclear images normal 60% Limitation of activity was due to knee pain and sciatica pain.     2. Hyperlipidemia. Monitored by Dr. Zhu.  12/19/2023 LDL 87 HDL 72 triglycerides 116. Wt loss and more stringent dietary modification will help. Target LDL <70.      3. Hypertension. BP up, but related to lifestyle. Wt up 15#. He will focus on this.     4. Cardiomyopathy.  Exercise nuclear stress test 10/9/2023 negative for ischemia frequent PVCs ejection fraction 60%.  Ejection fraction on the echo 10/9/2023 55 to 60%    RTC 1 year.       Liat Mccartney MD     Instructions and follow up

## 2024-03-05 ENCOUNTER — OFFICE VISIT (OUTPATIENT)
Dept: CARDIOLOGY | Facility: CLINIC | Age: 69
End: 2024-03-05
Payer: MEDICARE

## 2024-03-05 VITALS
HEART RATE: 83 BPM | BODY MASS INDEX: 30.56 KG/M2 | WEIGHT: 213 LBS | OXYGEN SATURATION: 96 % | SYSTOLIC BLOOD PRESSURE: 134 MMHG | DIASTOLIC BLOOD PRESSURE: 78 MMHG

## 2024-03-05 DIAGNOSIS — I42.0 CARDIOMYOPATHY, DILATED (MULTI): Chronic | ICD-10-CM

## 2024-03-05 DIAGNOSIS — I25.10 CORONARY ARTERY DISEASE INVOLVING NATIVE CORONARY ARTERY OF NATIVE HEART WITHOUT ANGINA PECTORIS: Chronic | ICD-10-CM

## 2024-03-05 DIAGNOSIS — I10 BENIGN ESSENTIAL HYPERTENSION: Chronic | ICD-10-CM

## 2024-03-05 DIAGNOSIS — E78.00 HYPERCHOLESTEROLEMIA: Primary | Chronic | ICD-10-CM

## 2024-03-05 PROCEDURE — 1159F MED LIST DOCD IN RCRD: CPT | Performed by: INTERNAL MEDICINE

## 2024-03-05 PROCEDURE — 1157F ADVNC CARE PLAN IN RCRD: CPT | Performed by: INTERNAL MEDICINE

## 2024-03-05 PROCEDURE — 99214 OFFICE O/P EST MOD 30 MIN: CPT | Performed by: INTERNAL MEDICINE

## 2024-03-05 PROCEDURE — 1160F RVW MEDS BY RX/DR IN RCRD: CPT | Performed by: INTERNAL MEDICINE

## 2024-03-05 PROCEDURE — 3078F DIAST BP <80 MM HG: CPT | Performed by: INTERNAL MEDICINE

## 2024-03-05 PROCEDURE — 3075F SYST BP GE 130 - 139MM HG: CPT | Performed by: INTERNAL MEDICINE

## 2024-03-05 PROCEDURE — 1126F AMNT PAIN NOTED NONE PRSNT: CPT | Performed by: INTERNAL MEDICINE

## 2024-03-05 NOTE — PATIENT INSTRUCTIONS
1. CAD. Elevated calcium score 594 Agatston units. No EKG today due to BL pink eye.  Continue with aspirin atorvastatin and lisinopril.  Exercise nuclear stress test 10/9/2023 below average capacity, no symptoms, normal EKG, frequent PVCs, nuclear images normal 60% Limitation of activity was due to knee pain and sciatica pain.     2. Hyperlipidemia. Monitored by Dr. Zhu.  12/19/2023 LDL 87 HDL 72 triglycerides 116. Wt loss and more stringent dietary modification will help. Target LDL <70.      3. Hypertension. BP up, but related to lifestyle. Wt up 15#. He will focus on this.     4. Cardiomyopathy.  Exercise nuclear stress test 10/9/2023 negative for ischemia frequent PVCs ejection fraction 60%.  Ejection fraction on the echo 10/9/2023 55 to 60%    RTC 1 year.

## 2024-05-17 ENCOUNTER — APPOINTMENT (OUTPATIENT)
Dept: GASTROENTEROLOGY | Facility: CLINIC | Age: 69
End: 2024-05-17
Payer: MEDICARE

## 2024-05-27 DIAGNOSIS — E78.00 HYPERCHOLESTEROLEMIA: Chronic | ICD-10-CM

## 2024-05-29 RX ORDER — ATORVASTATIN CALCIUM 40 MG/1
40 TABLET, FILM COATED ORAL NIGHTLY
Qty: 90 TABLET | Refills: 3 | Status: SHIPPED | OUTPATIENT
Start: 2024-05-29

## 2024-06-17 ENCOUNTER — OFFICE VISIT (OUTPATIENT)
Dept: CARDIOLOGY | Facility: CLINIC | Age: 69
End: 2024-06-17
Payer: MEDICARE

## 2024-06-17 ENCOUNTER — HOSPITAL ENCOUNTER (OUTPATIENT)
Dept: CARDIOLOGY | Facility: CLINIC | Age: 69
Discharge: HOME | End: 2024-06-17
Payer: MEDICARE

## 2024-06-17 ENCOUNTER — HOSPITAL ENCOUNTER (OUTPATIENT)
Dept: VASCULAR MEDICINE | Facility: CLINIC | Age: 69
Discharge: HOME | End: 2024-06-17
Payer: MEDICARE

## 2024-06-17 VITALS
HEART RATE: 71 BPM | SYSTOLIC BLOOD PRESSURE: 160 MMHG | BODY MASS INDEX: 30.85 KG/M2 | DIASTOLIC BLOOD PRESSURE: 95 MMHG | OXYGEN SATURATION: 96 % | WEIGHT: 215 LBS

## 2024-06-17 DIAGNOSIS — I73.9 PERIPHERAL VASCULAR DISEASE (CMS-HCC): Primary | ICD-10-CM

## 2024-06-17 DIAGNOSIS — R42 DIZZINESS: ICD-10-CM

## 2024-06-17 DIAGNOSIS — I73.89 OTHER SPECIFIED PERIPHERAL VASCULAR DISEASES (CMS-HCC): ICD-10-CM

## 2024-06-17 DIAGNOSIS — I73.9 PERIPHERAL VASCULAR DISEASE (CMS-HCC): ICD-10-CM

## 2024-06-17 PROBLEM — U07.1 DISEASE DUE TO SEVERE ACUTE RESPIRATORY SYNDROME CORONAVIRUS 2 (SARS-COV-2): Status: ACTIVE | Noted: 2024-06-17

## 2024-06-17 PROCEDURE — 1159F MED LIST DOCD IN RCRD: CPT | Performed by: NURSE PRACTITIONER

## 2024-06-17 PROCEDURE — 99213 OFFICE O/P EST LOW 20 MIN: CPT | Mod: 25 | Performed by: NURSE PRACTITIONER

## 2024-06-17 PROCEDURE — 99213 OFFICE O/P EST LOW 20 MIN: CPT | Performed by: NURSE PRACTITIONER

## 2024-06-17 PROCEDURE — 93242 EXT ECG>48HR<7D RECORDING: CPT

## 2024-06-17 PROCEDURE — 3080F DIAST BP >= 90 MM HG: CPT | Performed by: NURSE PRACTITIONER

## 2024-06-17 PROCEDURE — 93923 UPR/LXTR ART STDY 3+ LVLS: CPT | Performed by: INTERNAL MEDICINE

## 2024-06-17 PROCEDURE — 3077F SYST BP >= 140 MM HG: CPT | Performed by: NURSE PRACTITIONER

## 2024-06-17 PROCEDURE — 93923 UPR/LXTR ART STDY 3+ LVLS: CPT

## 2024-06-17 PROCEDURE — 1157F ADVNC CARE PLAN IN RCRD: CPT | Performed by: NURSE PRACTITIONER

## 2024-06-17 RX ORDER — ASPIRIN 325 MG
100 TABLET, DELAYED RELEASE (ENTERIC COATED) ORAL DAILY
COMMUNITY

## 2024-06-17 NOTE — PROGRESS NOTES
Easton Ragland is a 69 y.o. male     History Of Present Illness   Mr Ragland is a 69 year old male here with CAD, hypertension, hyperlipidemia, H/O TIA and cardiomyopathy, here as a new consult for complaints of right subclavian stenosis, dizziness and unsteadiness.  He is also followed by neurology and was instructed to under go a tilt table test, however this was cancelled when he was found to have the subclavian stenosis.         Social HX  Social History     Tobacco Use    Smoking status: Some Days     Types: Cigars    Smokeless tobacco: Never   Substance Use Topics    Alcohol use: Yes     Alcohol/week: 6.0 standard drinks of alcohol     Types: 6 Shots of liquor per week    Drug use: Yes     Frequency: 2.0 times per week     Comment: gummies          Family HX  No family history on file.       Review Of Systems   Constitutional: not feeling tired.   Eyes: no eyesight problems.  No vision loss or change in vision  ENT: no hearing loss and no nosebleeds.   Cardiovascular: No intermittent leg claudication,   No chest pain, no tightness or heavy pressure  No shortness of breath,  No palpitations,  No lower extremity edema  The heart rate is regular  Respiratory: no chronic cough and no shortness of breath.   Gastrointestinal: no change in bowel habits and no blood in stools.   Genitourinary: no urinary frequency.   Skin: no skin rashes.   Neurological: No frequent falls.   + dizziness, feeling light headed  + weakness  Denies headaches  Psychiatric: no depression and not suicidal.   All other systems have been reviewed and are negative for complaint.      Allergies  Allergies   Allergen Reactions    Penicillins Hives and Itching    Clarithromycin Unknown    Bee Pollen Runny nose          Vitals  There were no vitals taken for this visit.        Physical Exam  Constitutional: alert and in no acute distress.   Eyes: no erythema, swelling or discharge from the eye .   Neck: neck is supple, symmetric, trachea midline,  [facility]  Progress Note    SUBJECTIVE:     Chief Complaint   Patient presents with    ADHD       HPI   Cristina Lee  is a 28 y.o. female here for follow up of her chronic conditions. Doing well on current regimen of ADHD. Takes meds as directed. Patient has no other new complaints/problems at this time.      Review of patient's allergies indicates:  No Known Allergies    Past Medical History:   Diagnosis Date    Abnormal Pap smear of cervix     ASCUS, + HR HPV, Mild Dysplasia on colpo  Sep 2014    Depression     Menarche age 11    Migraine headache     Trauma age 7    Left wrist fracture     Past Surgical History:   Procedure Laterality Date    CERVICAL BIOPSY  W/ LOOP ELECTRODE EXCISION      Moderate dysplasia, completely excised.     SECTION  2010, December 2006    X 2     Family History   Problem Relation Age of Onset    Hypertension Mother     Hypertension Father     Breast cancer Neg Hx     Colon cancer Neg Hx     Ovarian cancer Neg Hx     Diabetes Neg Hx     Stroke Neg Hx      Social History   Substance Use Topics    Smoking status: Former Smoker     Packs/day: 0.50     Years: 9.00     Types: Cigarettes     Quit date: 2010    Smokeless tobacco: Never Used    Alcohol use 0.0 oz/week      Comment: social        Review of Systems   Constitutional: Negative for chills, fever and malaise/fatigue.   HENT: Negative.    Respiratory: Negative.  Negative for cough and shortness of breath.    Cardiovascular: Negative.  Negative for chest pain.   Gastrointestinal: Negative.  Negative for abdominal pain, nausea and vomiting.   Genitourinary: Negative.    Neurological: Negative for weakness and headaches.   All other systems reviewed and are negative.    OBJECTIVE:     Vitals:    18 1335   BP: 106/72   Pulse: 86   Temp: 98.4 °F (36.9 °C)     Body mass index is 31.92 kg/m².    Physical Exam   Constitutional: She is oriented to person,  no masses  and no thyromegaly .   Pulmonary: No increased work of breathing or signs of respiratory distress    Lungs clear to auscultation.    No friction rub.  Cardiovascular: carotid pulses 2+ bilaterally with no bruit    JVP was normal, no thrills ,   Regular rhythm, normal S1 and S2, no murmurs    Pedal pulses 2+ bilaterally   Triphasic right radial pulse   No edema .   Abdomen: abdomen non-tender, no masses  and no hepatomegaly . No pulsatile mass noted  Skin: skin warm and dry, normal skin turgor .   Psychiatric judgment and insight is normal  and oriented to person, place and time .           Current/Home Meds    Current Outpatient Medications:     allopurinol (Zyloprim) 100 mg tablet, TAKE 1 TABLET DAILY, Disp: 90 tablet, Rfl: 0    ascorbic acid, vitamin C, 500 mg capsule, Take by mouth., Disp: , Rfl:     aspirin 81 mg EC tablet, Take 1 tablet (81 mg) by mouth once daily., Disp: , Rfl:     atorvastatin (Lipitor) 40 mg tablet, TAKE 1 TABLET AT BEDTIME, Disp: 90 tablet, Rfl: 3    docosahexaenoic acid/epa (FISH OIL ORAL), Fish Oil OIL  Refills: 0     Active, Disp: , Rfl:     glucosamine/chondr ambrose A sod (OSTEO BI-FLEX ORAL), Take by mouth., Disp: , Rfl:     multivitamin (MULTIPLE VITAMINS ORAL), Take by mouth early in the morning.., Disp: , Rfl:     NON FORMULARY, Herbal Supplement, Disp: , Rfl:     omeprazole (PriLOSEC) 40 mg DR capsule, Take 1 capsule by mouth 30 minutes before your biggest meal, or before breakfast daily. Do not crush or chew., Disp: 90 capsule, Rfl: 3    valsartan-hydrochlorothiazide (Diovan-HCT) 80-12.5 mg tablet, TAKE 1 TABLET DAILY, Disp: 90 tablet, Rfl: 0    Current Facility-Administered Medications:     Tc-99m tetrofosmin (Myoview) injection 23.3 millicurie, 23.3 millicurie, intravenous, Once in imaging, Liat Mccartney MD    Tc-99m tetrofosmin (Myoview) injection 8.3 millicurie, 8.3 millicurie, intravenous, Once in imaging, Liat Mccartney MD       Labs           EKG Findings  EKG:  place, and time and well-developed, well-nourished, and in no distress. No distress.   HENT:   Head: Normocephalic and atraumatic.   Nose: Nose normal.   Eyes: Conjunctivae and EOM are normal.   Cardiovascular: Normal rate, regular rhythm and normal heart sounds.  Exam reveals no gallop and no friction rub.    No murmur heard.  Pulmonary/Chest: Effort normal and breath sounds normal. No respiratory distress. She has no wheezes. She has no rales. She exhibits no tenderness.   Abdominal: Soft. Bowel sounds are normal. She exhibits no distension. There is no tenderness. There is no rebound.   Neurological: She is alert and oriented to person, place, and time.   Skin: Skin is warm. She is not diaphoretic.       Old records were reviewed. Labs and/or images were independently reviewed.    ASSESSMENT     1. Attention deficit hyperactivity disorder (ADHD), unspecified ADHD type    2. Mild recurrent major depression    3. Chronic migraine    4. Anxiety        PLAN:     Attention deficit hyperactivity disorder (ADHD), unspecified ADHD type  -     dextroamphetamine-amphetamine (ADDERALL) 15 mg tablet; Take 1 tablet (15 mg total) by mouth once daily.  Dispense: 30 tablet; Refill: 0  -     dextroamphetamine-amphetamine (ADDERALL) 15 mg tablet; Take 1 tablet (15 mg total) by mouth once daily.  Dispense: 30 tablet; Refill: 0  -     dextroamphetamine-amphetamine (ADDERALL) 15 mg tablet; Take 1 tablet (15 mg total) by mouth once daily.  Dispense: 30 tablet; Refill: 0  -     Stable. Continue current regimen.    Mild recurrent major depression   -     Stable. Continue current regimen.    Chronic migraine   -     Stable. Continue current regimen.    Anxiety  -     venlafaxine (EFFEXOR-XR) 75 MG 24 hr capsule; Take 1 capsule (75 mg total) by mouth once daily.  Dispense: 90 capsule; Refill: 3  -     Stable. Continue current regimen.    RTC DANNY Nazario MD  04/20/2018 2:08 PM               Cardiac Service Results:  TST 10/9/2023 negative EKG, below average capacity, frequent PVCs, no symptoms, nuclear images normal ejection fraction 60%     Echo 10/9/2023 EF 55 to 60%, DDF     AA U/S [01/11/2021]: No evidence of aneurysm.     ECHO [07/07/2020]: EF 55-60%. SD = impaired relaxation pattern. Aorta mildly dial (3.9 cm). Atrial septal aneurysm present.     NST [02/05/2020]: Normal. There is mildly reduced LVSF w/ex EF 49%.     CAC (11/13/2019) score 594 aga units. Severe disease. Extensive calcified plaque w/in Prox and Mid L anterior descending coronary artery.      CAROTID (05/21/2015) [place] & [place] 16-49% stenosis                 Assessment/Plan       Right subclavian stenosis:  Patient has a triphasic right radial,  Monophasic right brachial.  Will plan for a upper extremity PVR.  He is also complaining of severe dizziness and is unsteady.  His neurologist ordered a tilt table test, however this was cancelled due to a right subclavian stenosis.  Will plan for an autonomic testing in [place] and he will follow up as directed.      Addendum:  PVR of the upper extremities shows:  Right Upper Arterial: Arterial duplex evaluation of the right upper extremity reveals no evidence of significant arterial disease..The vertebral artery is noted to be antegrade.  Right Upper PVR: Baseline indices > 0.80 and waveforms appear normal. No evidence of arterial disease.  Left Upper PVR: Baseline indices > 0.80 and waveforms appear normal. No eivdence of arterial disease.     Imaging & Doppler Findings:     RIGHT Digit Pressures  Index digit           144 mmHg     Radial Ratio          1.05  Digit Ratio           0.97        LEFT Digit Pressures  Index digit          141 mmHg     Radial Ratio         1.03  Digit Ratio          0.95         Right                                 Left    PSV    Waveform                      PSV  Waveform  218 cm/s Triphasic Subclavian Proximal  75 cm/s  Triphasic   Subclavian  Mid  77 cm/s  Triphasic      Axillary  98 cm/s  Triphasic  Brachial Proximal  114 cm/s Triphasic    Brachial Mid  73 cm/s  Triphasic   Brachial Distal  95 cm/s  Triphasic       Radial  41 cm/s  Triphasic        Ulnar                           Right     Left  Brachial Pressure 145 mmHg 149 mmHg       Radial       157 mmHg 153 mmHg

## 2024-06-21 ENCOUNTER — APPOINTMENT (OUTPATIENT)
Dept: GASTROENTEROLOGY | Facility: CLINIC | Age: 69
End: 2024-06-21
Payer: MEDICARE

## 2024-06-21 VITALS
SYSTOLIC BLOOD PRESSURE: 156 MMHG | DIASTOLIC BLOOD PRESSURE: 96 MMHG | HEIGHT: 70 IN | HEART RATE: 82 BPM | BODY MASS INDEX: 30.49 KG/M2 | WEIGHT: 213 LBS

## 2024-06-21 DIAGNOSIS — K76.0 FATTY LIVER: ICD-10-CM

## 2024-06-21 DIAGNOSIS — Z12.11 COLON CANCER SCREENING: Primary | ICD-10-CM

## 2024-06-21 DIAGNOSIS — K21.9 LARYNGOPHARYNGEAL REFLUX (LPR): ICD-10-CM

## 2024-06-21 DIAGNOSIS — F17.299 NICOTINE DEPENDENCE, OTH TOBACCO PRODUCT, W UNSP DISORDERS: ICD-10-CM

## 2024-06-21 DIAGNOSIS — Z78.9 ALCOHOL USE: ICD-10-CM

## 2024-06-21 DIAGNOSIS — D12.6 COLON ADENOMA: ICD-10-CM

## 2024-06-21 DIAGNOSIS — R16.0 HEPATOMEGALY: ICD-10-CM

## 2024-06-21 DIAGNOSIS — R05.3 CHRONIC COUGH: ICD-10-CM

## 2024-06-21 DIAGNOSIS — R12 HEARTBURN: ICD-10-CM

## 2024-06-21 PROCEDURE — 3080F DIAST BP >= 90 MM HG: CPT | Performed by: STUDENT IN AN ORGANIZED HEALTH CARE EDUCATION/TRAINING PROGRAM

## 2024-06-21 PROCEDURE — 3077F SYST BP >= 140 MM HG: CPT | Performed by: STUDENT IN AN ORGANIZED HEALTH CARE EDUCATION/TRAINING PROGRAM

## 2024-06-21 PROCEDURE — 99406 BEHAV CHNG SMOKING 3-10 MIN: CPT | Performed by: STUDENT IN AN ORGANIZED HEALTH CARE EDUCATION/TRAINING PROGRAM

## 2024-06-21 PROCEDURE — 1159F MED LIST DOCD IN RCRD: CPT | Performed by: STUDENT IN AN ORGANIZED HEALTH CARE EDUCATION/TRAINING PROGRAM

## 2024-06-21 PROCEDURE — 1157F ADVNC CARE PLAN IN RCRD: CPT | Performed by: STUDENT IN AN ORGANIZED HEALTH CARE EDUCATION/TRAINING PROGRAM

## 2024-06-21 PROCEDURE — 1160F RVW MEDS BY RX/DR IN RCRD: CPT | Performed by: STUDENT IN AN ORGANIZED HEALTH CARE EDUCATION/TRAINING PROGRAM

## 2024-06-21 PROCEDURE — 99205 OFFICE O/P NEW HI 60 MIN: CPT | Performed by: STUDENT IN AN ORGANIZED HEALTH CARE EDUCATION/TRAINING PROGRAM

## 2024-06-21 RX ORDER — SODIUM, POTASSIUM,MAG SULFATES 17.5-3.13G
1 SOLUTION, RECONSTITUTED, ORAL ORAL 2 TIMES DAILY
Qty: 354 ML | Refills: 0 | Status: SHIPPED | OUTPATIENT
Start: 2024-06-21

## 2024-06-21 RX ORDER — SODIUM CHLORIDE, SODIUM LACTATE, POTASSIUM CHLORIDE, CALCIUM CHLORIDE 600; 310; 30; 20 MG/100ML; MG/100ML; MG/100ML; MG/100ML
20 INJECTION, SOLUTION INTRAVENOUS CONTINUOUS
OUTPATIENT
Start: 2024-06-21

## 2024-06-21 RX ORDER — ONDANSETRON HYDROCHLORIDE 2 MG/ML
4 INJECTION, SOLUTION INTRAVENOUS ONCE AS NEEDED
OUTPATIENT
Start: 2024-06-21

## 2024-06-21 NOTE — PATIENT INSTRUCTIONS
1-we need to schedule for an upper endoscopy and a colonoscopy after we get cardiology clearance.  For the last couple of weeks before your procedure please cut down on smoking and using marijuana because they both can increase the risk of complications during the procedure  2-for the cough and phlegm and the rare episodes of heartburn, we will send you for a lung doctor consultation especially in view of your smoking status,  please elevate the head of the bed 6 to 8 inches, have an early dinner at least 3 hours before sleep, have small frequent meals (5 small meals per day), avoid lying down after meals, avoid spices acid and fatty food  3-you have fatty liver on the ultrasound from December 2022, we need to repeat some blood test, please cut down on alcohol as much as you can in order to prevent complications from fatty liver such as cirrhosis and liver cancer

## 2024-06-21 NOTE — PROGRESS NOTES
69-year-old gentleman with history of CAD dyslipidemia hypertension CVA osteoarthritis diastolic heart failure is referred by ENT as per him because of 6-month history of morning cough and phlegm production.  He mentions rare episodes of heartburn.  He mentions intermittent history of from laying in his abdomen unrelated to food or bowel movements.  He mentioned that he drinks bourbon 2-3 times per day for many years.    EGD never  Fit test -2018  Colonoscopy 4/2021 3 transverse and ascending TA and HP, sigmoid TA, internal hemorrhoids, [place] 9, repeat in 3 to 5 years  Family history reviewed, not pertinent to chief complaint  1 bowel movement per day  Denies NSAIDs, daily bourbon use 2-3 times per day, denies IV drug use, positive marijuana use, positive smoking cigars for 30 years currently cutting down             The note was created using voice recognition transcription software. Despite proofreading, unintentional typographical errors may be present. Please contact the GI office with any questions or concerns.     Current Medications: reviewed    A 10 point review of system is negative except for what is mentioned in the HPI    Follow up with GI was advised       Vital Signs: Reviewed    Physical Exam:  General: no apparent distress, pleasant and cooperative  Skin:  Warm and dry, no jaundice  HEENT: No scleral icterus, no conjunctival pallor, normocephalic, atraumatic, mucous membranes moist  Neck:  atraumatic, trachea midline, no JVD  Chest:  decreased air entry to auscultation bilaterally. No wheezes, rales, or rhonchi  CV:  Regular rate and rhythm.  Positive S1/S2  Abdomen: Positive ventral hernia  no distension, +BS, soft, non-tender to palpation, no rebound tenderness, no guarding, no rigidity, no discernible ascites   Extremities: no lower extremity edema, Chronic pigmentary changes, no cyanosis  Neurological:  A&Ox3 , no asterixis  Psychiatric: cooperative     Investigations:  Labs, radiological  imaging and cardiac work up were reviewed    1-hepatitis C antibody - 10/2019    2-BMI 30, healthy lifestyle advised    3-Healthcare maintenance, colonoscopy as above, will repeat colonoscopy after cardiology clearance    4-hepatomegaly and hepatic steatosis on ultrasound 12/2022, in the setting of daily bourbon use, alcohol abstinence advised, ELF 9.16 Low     5-Daily cigar use, marijuana use, substance and smoking cessation advised, 4 minutes    6-sore throat, chronic cough with phlegm production with reported rare episodes of heartburn, mentions the sore throat got better with omeprazole, seen by ENT in 10/2023 possible LPR, will schedule EGD at the same time as colonoscopy after cardiology clearance, pulmonary referred

## 2024-06-22 DIAGNOSIS — Z78.9 ALCOHOL USE: Primary | ICD-10-CM

## 2024-06-22 RX ORDER — LANOLIN ALCOHOL/MO/W.PET/CERES
100 CREAM (GRAM) TOPICAL DAILY
Qty: 30 TABLET | Refills: 11 | Status: SHIPPED | OUTPATIENT
Start: 2024-06-22 | End: 2025-06-22

## 2024-06-22 RX ORDER — FOLIC ACID 1 MG/1
1 TABLET ORAL DAILY
Qty: 30 TABLET | Refills: 11 | Status: SHIPPED | OUTPATIENT
Start: 2024-06-22 | End: 2025-06-22

## 2024-06-24 DIAGNOSIS — M10.9 GOUT, UNSPECIFIED CAUSE, UNSPECIFIED CHRONICITY, UNSPECIFIED SITE: ICD-10-CM

## 2024-06-24 RX ORDER — ALLOPURINOL 100 MG/1
100 TABLET ORAL DAILY
Qty: 90 TABLET | Refills: 3 | Status: SHIPPED | OUTPATIENT
Start: 2024-06-24

## 2024-06-25 ENCOUNTER — OFFICE VISIT (OUTPATIENT)
Dept: PULMONOLOGY | Facility: CLINIC | Age: 69
End: 2024-06-25
Payer: MEDICARE

## 2024-06-25 VITALS
OXYGEN SATURATION: 97 % | SYSTOLIC BLOOD PRESSURE: 143 MMHG | WEIGHT: 211 LBS | HEART RATE: 86 BPM | DIASTOLIC BLOOD PRESSURE: 72 MMHG | TEMPERATURE: 97.8 F | BODY MASS INDEX: 30.28 KG/M2

## 2024-06-25 DIAGNOSIS — R05.3 CHRONIC COUGH: ICD-10-CM

## 2024-06-25 DIAGNOSIS — Z77.090 ASBESTOS EXPOSURE: Primary | ICD-10-CM

## 2024-06-25 PROCEDURE — 99214 OFFICE O/P EST MOD 30 MIN: CPT | Performed by: NURSE PRACTITIONER

## 2024-06-25 PROCEDURE — 1157F ADVNC CARE PLAN IN RCRD: CPT | Performed by: NURSE PRACTITIONER

## 2024-06-25 PROCEDURE — 1159F MED LIST DOCD IN RCRD: CPT | Performed by: NURSE PRACTITIONER

## 2024-06-25 PROCEDURE — 99406 BEHAV CHNG SMOKING 3-10 MIN: CPT | Performed by: NURSE PRACTITIONER

## 2024-06-25 PROCEDURE — 3077F SYST BP >= 140 MM HG: CPT | Performed by: NURSE PRACTITIONER

## 2024-06-25 PROCEDURE — 3078F DIAST BP <80 MM HG: CPT | Performed by: NURSE PRACTITIONER

## 2024-06-25 PROCEDURE — 1126F AMNT PAIN NOTED NONE PRSNT: CPT | Performed by: NURSE PRACTITIONER

## 2024-06-25 PROCEDURE — 99215 OFFICE O/P EST HI 40 MIN: CPT | Performed by: NURSE PRACTITIONER

## 2024-06-25 PROCEDURE — 99204 OFFICE O/P NEW MOD 45 MIN: CPT | Performed by: NURSE PRACTITIONER

## 2024-06-25 ASSESSMENT — ENCOUNTER SYMPTOMS
JOINT SWELLING: 0
DIZZINESS: 1
EYE PAIN: 0
NUMBNESS: 0
ABDOMINAL PAIN: 0
SINUS PRESSURE: 1
NERVOUS/ANXIOUS: 1
HEADACHES: 0
FEVER: 0
RHINORRHEA: 1
WEAKNESS: 0
BACK PAIN: 1
VOMITING: 0
NAUSEA: 0
PALPITATIONS: 0
DIARRHEA: 0
FATIGUE: 0
MYALGIAS: 0
DEPRESSION: 0
VOICE CHANGE: 0
ARTHRALGIAS: 1
AGITATION: 0

## 2024-06-25 ASSESSMENT — PAIN SCALES - GENERAL: PAINLEVEL: 0-NO PAIN

## 2024-06-25 NOTE — PATIENT INSTRUCTIONS
Chronic cough:   - will get baseline PFTs   - will get CT chest - asbestos exposure and previous bronchial wall thickening on CT at [facility]   - continue levocetirizine (Xyzal) or flonase daily   - continue omeprazole (prilosec) daily       Thank you for visiting the Pulmonary clinic today!   Return to clinic 3 months after PFTs and CT scan or sooner if needed   Meredith Barragan CNP  My office -  [phone]- Malika is my nurse.   Radiology scheduling [phone]   Appointment scheduling [phone]   Pulmonary function testing - [phone]

## 2024-06-25 NOTE — PROGRESS NOTES
Patient: Easton Ragland    [id number]  : 1955 -- AGE 69 y.o.    Provider: MOISE Simmons     Location [facility]   Service Date: 2024              [facility]  New Visit Note      HISTORY OF PRESENT ILLNESS     The patient's referring provider is: Ravi Pedroza MD    HISTORY OF PRESENT ILLNESS   Easton Ragland is a 69 y.o. male who presents to a [facility] for an evaluation with concerns of No chief complaint on file.. I have independently interviewed and examined the patient in the office and reviewed available records.    PCP: Dr. Zhu   GI: Dr. Pedroza   ENT: Mary Lou Rebolledo CNP   Cardiologist: Dr. Zapien -- Breonna CRANE     Current History    On today's visit, the patient reports he has been having issues with balance. He had seen neurology - US of his carotid arteries. He was supposed to have a tilt table test- given stenosis of subclavian found on US. Couldn't get in with his cardiologist as they requeted -- saw Breonna CRANE who ordered heart monitor as well as PVRs. He had seen Dr. Pedroza with GI - ordered colonoscopy and EGD.  She was seen by ENT - significant sore throat from coughing. He states he started on omeprazole -- states sore throat was helpful for the sore throat, but still having a cough. He feels cough is still bothersome in the morning more so related to post nasal drip.  He has had pneumonia twice and bronchitis one.      He feels his post nasal drip drives his morning cough. He uses xyzal at night PRN and flonase PRN.  He has not tried doing both these daily every day. He states cough improved some previously when switched from lisinopril to valsartan. He was taking zyrtec daily prior to the xyzal.  He states he is concerned about taking meds if he drinking bourbon at night. He only notices LYLE with strenuous exercise - lifts weights and goes up/down the stairs (5x a day for  exercise). He was walking 3 miles 3x a week - has not been walking as much because his wife hip/knee replacement. He is a little concerned for balance as well as sciatica - so this has limited his activity as well.  He denies any wheezing, SOB at rest, or CP. He denies any GERD.     Previous pulmonary history: He has no history of recurrent infections, or lung disease as a child.  He had no previous lung hx, never on oxygen or inhaler therapy.     Inhalers/nebulized medications: none     Hospitalization History: He has not been hospitalized over the last year for breathing related problem.    Sleep history:  Intermittent snoring.  He wakes up feeling rested - naps, but can doze off reading.     ALLERGIES AND MEDICATIONS     ALLERGIES  Allergies   Allergen Reactions    Penicillins Hives and Itching    Clarithromycin Unknown    Bee Pollen Runny nose       MEDICATIONS  Current Outpatient Medications   Medication Sig Dispense Refill    allopurinol (Zyloprim) 100 mg tablet TAKE 1 TABLET DAILY 90 tablet 3    ascorbic acid, vitamin C, 500 mg capsule Take by mouth.      aspirin 81 mg EC tablet Take 1 tablet (81 mg) by mouth once daily.      atorvastatin (Lipitor) 40 mg tablet TAKE 1 TABLET AT BEDTIME 90 tablet 3    co-enzyme Q-10 50 mg capsule Take 2 capsules (100 mg) by mouth once daily.      docosahexaenoic acid/epa (FISH OIL ORAL) Fish Oil OIL   Refills: 0       Active      folic acid (Folvite) 1 mg tablet Take 1 tablet (1 mg) by mouth once daily. 30 tablet 11    glucosamine/chondr ambrose A sod (OSTEO BI-FLEX ORAL) Take by mouth.      multivitamin (MULTIPLE VITAMINS ORAL) Take by mouth early in the morning..      omeprazole (PriLOSEC) 40 mg DR capsule Take 1 capsule by mouth 30 minutes before your biggest meal, or before breakfast daily. Do not crush or chew. 90 capsule 3    thiamine 100 mg tablet Take 1 tablet (100 mg) by mouth once daily. 30 tablet 11    valsartan-hydrochlorothiazide (Diovan-HCT) 80-12.5 mg tablet TAKE 1  TABLET DAILY 90 tablet 0    sodium,potassium,mag sulfates (Suprep) 17.5-3.13-1.6 gram recon soln solution Take 1 bottle by mouth 2 times a day. Take one bottle twice as directed by the prep instructions (Patient not taking: Reported on 6/25/2024) 354 mL 0     Current Facility-Administered Medications   Medication Dose Route Frequency Provider Last Rate Last Admin    Tc-99m tetrofosmin (Myoview) injection 23.3 millicurie  23.3 millicurie intravenous Once in imaging Liat Mccartney MD        Tc-99m tetrofosmin (Myoview) injection 8.3 millicurie  8.3 millicurie intravenous Once in imaging Liat Mccartney MD             PAST HISTORY     PAST MEDICAL HISTORY  - HTN   - CAD   - gout   - HLD   - TIA ? CVA- 5/2015   - dizziness - right subclavian stenosis   - knee replacement   - vertigo   - sciatica   - DVT - 2010 - unprovoked - was on a few months of xarelto     PAST SURGICAL HISTORY  Past Surgical History:   Procedure Laterality Date    OTHER SURGICAL HISTORY  12/06/2019    Tonsillectomy with adenoidectomy    OTHER SURGICAL HISTORY  12/06/2019    Knee replacement       IMMUNIZATION HISTORY  Immunization History   Administered Date(s) Administered    Flu vaccine (IIV4), preservative free *Check age/dose* 11/29/2015, 10/12/2016    Flu vaccine, quadrivalent, high-dose, preservative free, age 65y+ (FLUZONE) 09/12/2020, 10/05/2021, 10/05/2022    Flu vaccine, quadrivalent, no egg protein, age 6 month or greater (FLUCELVAX) 11/12/2017    Hepatitis B vaccine, 19 yrs and under (RECOMBIVAX, ENGERIX) 03/28/1994, 05/02/1994, 10/05/1994    Influenza, High Dose Seasonal, Preservative Free 09/30/2018    Influenza, trivalent, adjuvanted 09/29/2019    Moderna COVID-19 vaccine, bivalent, blue cap/gray label *Check age/dose* 10/05/2022, 05/24/2023    Pneumococcal conjugate vaccine, 13-valent (PREVNAR 13) 10/29/2018    Pneumococcal polysaccharide vaccine, 23-valent, age 2 years and older (PNEUMOVAX 23) 12/29/2019    Tdap vaccine, age 7 year and older  "(BOOSTRIX, ADACEL) 08/04/2016, 07/03/2018    Zoster vaccine, recombinant, adult (SHINGRIX) 09/12/2020, 12/07/2020    Zoster, live 03/16/2017       SOCIAL HISTORY  Smoking: cigars for 30 years - when golfing 3-4 cigars a week (in summer).  More recently a cigar a month.    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     FAMILY HISTORY  FAMILY HISTORY: No family Hx of lung disease or lung cancer.    RESULTS/DATA     Pulmonary Function Test Results     None on record     Chest Radiograph     XR chest 2 view 12/13/2022    1. No acute cardiopulmonary process.      No orders to display        Chest CT Scan     12/26/21 - [facility] -   No CT evidence of pulmonary embolism within constraints of the exam.     Diffuse bronchial wall thickening secondary to bronchitis/bronchiolitis.    Scattered subsegmental atelectasis in the inferior lingula and bilateral   lower lobes.  Heterogeneous attenuation of the lung parenchyma with   groundglass changes in the lower lobes in part due to respiratory motion   and hypoinflation; nonspecific small airways inflammation is also   suspected in this patient with diffuse bronchial wall thickening.          Echocardiogram     10/9/23 - Left ventricular systolic function is normal with a 55-60% estimated ejection fraction.   2. Spectral Doppler shows an impaired relaxation pattern of left ventricular diastolic filling.         Other testing/ Labs      No results found for: \"EOSABS\"  No results found for: \"IGE\"    REVIEW OF SYSTEMS     REVIEW OF SYSTEMS  Review of Systems   Constitutional:  Negative for fatigue and fever.   HENT:  Positive for congestion, postnasal drip, rhinorrhea and sinus pressure. Negative for voice change.    Eyes:  Negative for pain and visual disturbance.   Cardiovascular:  Negative for " chest pain, palpitations and leg swelling.   Gastrointestinal:  Negative for abdominal pain, diarrhea, nausea and vomiting.   Endocrine: Negative for cold intolerance and heat intolerance.   Musculoskeletal:  Positive for arthralgias and back pain. Negative for joint swelling and myalgias.   Skin:  Negative for rash.   Neurological:  Positive for dizziness. Negative for weakness, numbness and headaches.   Psychiatric/Behavioral:  Negative for agitation. The patient is nervous/anxious.          PHYSICAL EXAM     VITAL SIGNS: /72   Pulse 86   Temp 36.6 °C (97.8 °F)   Wt 95.7 kg (211 lb)   SpO2 97%   BMI 30.28 kg/m²      CURRENT WEIGHT: [unfilled]  BMI: [unfilled]  PREVIOUS WEIGHTS:  Wt Readings from Last 3 Encounters:   06/25/24 95.7 kg (211 lb)   06/21/24 96.6 kg (213 lb)   06/17/24 97.5 kg (215 lb)       Physical Exam  Vitals reviewed.   Constitutional:       General: He is not in acute distress.     Appearance: Normal appearance. He is not ill-appearing or toxic-appearing.   HENT:      Head: Normocephalic.      Nose: No rhinorrhea.   Cardiovascular:      Rate and Rhythm: Normal rate and regular rhythm.      Heart sounds: Normal heart sounds.   Pulmonary:      Effort: Pulmonary effort is normal. No respiratory distress.      Breath sounds: Normal breath sounds. No stridor. No wheezing, rhonchi or rales.   Abdominal:      General: Abdomen is flat.   Musculoskeletal:         General: Normal range of motion.      Right lower leg: No edema.      Left lower leg: No edema.   Skin:     General: Skin is warm and dry.      Nails: There is no clubbing.   Neurological:      General: No focal deficit present.      Mental Status: He is alert and oriented to person, place, and time.   Psychiatric:         Mood and Affect: Mood normal.         Behavior: Behavior normal.         Judgment: Judgment normal.       ASSESSMENT/PLAN     Chronic cough: Improvement with both PPI as well as xyzal/flonase- likely multifactorial. Hx of  asbestos exposure at the steel mill. Cigar smoker for 30 years.  CT in 2021 with inflammation/ bronchial wall thickening -- may trial ICS/LABA depending on PFTs.    - will get baseline PFTs   - will get CT chest - asbestos exposure and previous bronchial wall thickening on CT at [facility]   - continue levocetirizine (Xyzal) or flonase daily   - continue omeprazole (prilosec) daily     2. Cigar smoking:   - discussed smoking cessation for 3 minutes       Thank you for visiting the Pulmonary clinic today!   Return to clinic 3 months after PFTs and CT scan or sooner if needed   Meredith Barragan CNP  My office -  [phone]- Malika is my nurse.   Radiology scheduling [phone]   Appointment scheduling [phone]   Pulmonary function testing - [phone]

## 2024-07-01 ENCOUNTER — LAB (OUTPATIENT)
Dept: LAB | Facility: LAB | Age: 69
End: 2024-07-01
Payer: MEDICARE

## 2024-07-01 DIAGNOSIS — K76.0 FATTY LIVER: ICD-10-CM

## 2024-07-01 LAB
ALBUMIN SERPL BCP-MCNC: 4.3 G/DL (ref 3.4–5)
ALP SERPL-CCNC: 52 U/L (ref 33–136)
ALT SERPL W P-5'-P-CCNC: 34 U/L (ref 10–52)
ANION GAP SERPL CALC-SCNC: 12 MMOL/L (ref 10–20)
AST SERPL W P-5'-P-CCNC: 32 U/L (ref 9–39)
BASOPHILS # BLD AUTO: 0.07 X10*3/UL (ref 0–0.1)
BASOPHILS NFR BLD AUTO: 0.7 %
BILIRUB SERPL-MCNC: 0.8 MG/DL (ref 0–1.2)
BUN SERPL-MCNC: 17 MG/DL (ref 6–23)
CALCIUM SERPL-MCNC: 9.1 MG/DL (ref 8.6–10.3)
CHLORIDE SERPL-SCNC: 107 MMOL/L (ref 98–107)
CO2 SERPL-SCNC: 25 MMOL/L (ref 21–32)
CREAT SERPL-MCNC: 1.09 MG/DL (ref 0.5–1.3)
EGFRCR SERPLBLD CKD-EPI 2021: 73 ML/MIN/1.73M*2
EOSINOPHIL # BLD AUTO: 0.27 X10*3/UL (ref 0–0.4)
EOSINOPHIL NFR BLD AUTO: 2.7 %
ERYTHROCYTE [DISTWIDTH] IN BLOOD BY AUTOMATED COUNT: 12.7 % (ref 11.5–14.5)
GLUCOSE SERPL-MCNC: 118 MG/DL (ref 74–99)
HCT VFR BLD AUTO: 43.8 % (ref 41–52)
HGB BLD-MCNC: 15.3 G/DL (ref 13.5–17.5)
IMM GRANULOCYTES # BLD AUTO: 0.04 X10*3/UL (ref 0–0.5)
IMM GRANULOCYTES NFR BLD AUTO: 0.4 % (ref 0–0.9)
INR PPP: 0.9 (ref 0.9–1.1)
LYMPHOCYTES # BLD AUTO: 1.59 X10*3/UL (ref 0.8–3)
LYMPHOCYTES NFR BLD AUTO: 16.1 %
MCH RBC QN AUTO: 32.2 PG (ref 26–34)
MCHC RBC AUTO-ENTMCNC: 34.9 G/DL (ref 32–36)
MCV RBC AUTO: 92 FL (ref 80–100)
MONOCYTES # BLD AUTO: 0.88 X10*3/UL (ref 0.05–0.8)
MONOCYTES NFR BLD AUTO: 8.9 %
NEUTROPHILS # BLD AUTO: 7.02 X10*3/UL (ref 1.6–5.5)
NEUTROPHILS NFR BLD AUTO: 71.2 %
NRBC BLD-RTO: 0 /100 WBCS (ref 0–0)
PLATELET # BLD AUTO: 165 X10*3/UL (ref 150–450)
POTASSIUM SERPL-SCNC: 4.4 MMOL/L (ref 3.5–5.3)
PROT SERPL-MCNC: 6.6 G/DL (ref 6.4–8.2)
PROTHROMBIN TIME: 10.2 SECONDS (ref 9.8–12.8)
RBC # BLD AUTO: 4.75 X10*6/UL (ref 4.5–5.9)
SODIUM SERPL-SCNC: 140 MMOL/L (ref 136–145)
WBC # BLD AUTO: 9.9 X10*3/UL (ref 4.4–11.3)

## 2024-07-01 PROCEDURE — 80053 COMPREHEN METABOLIC PANEL: CPT

## 2024-07-01 PROCEDURE — 36415 COLL VENOUS BLD VENIPUNCTURE: CPT

## 2024-07-01 PROCEDURE — 85610 PROTHROMBIN TIME: CPT

## 2024-07-01 PROCEDURE — 85025 COMPLETE CBC W/AUTO DIFF WBC: CPT

## 2024-07-03 ENCOUNTER — APPOINTMENT (OUTPATIENT)
Dept: CARDIOLOGY | Facility: CLINIC | Age: 69
End: 2024-07-03
Payer: MEDICARE

## 2024-07-06 LAB — SCAN RESULT: NORMAL

## 2024-07-15 DIAGNOSIS — I10 HTN (HYPERTENSION), BENIGN: ICD-10-CM

## 2024-07-15 RX ORDER — VALSARTAN AND HYDROCHLOROTHIAZIDE 80; 12.5 MG/1; MG/1
1 TABLET, FILM COATED ORAL DAILY
Qty: 90 TABLET | Refills: 3 | Status: SHIPPED | OUTPATIENT
Start: 2024-07-15

## 2024-07-16 ENCOUNTER — HOSPITAL ENCOUNTER (OUTPATIENT)
Dept: RADIOLOGY | Facility: CLINIC | Age: 69
Discharge: HOME | End: 2024-07-16
Payer: MEDICARE

## 2024-07-16 DIAGNOSIS — R05.3 CHRONIC COUGH: ICD-10-CM

## 2024-07-16 PROCEDURE — 71250 CT THORAX DX C-: CPT | Performed by: RADIOLOGY

## 2024-07-16 PROCEDURE — 71250 CT THORAX DX C-: CPT

## 2024-07-18 DIAGNOSIS — Z78.9 ALCOHOL USE: ICD-10-CM

## 2024-07-18 RX ORDER — LANOLIN ALCOHOL/MO/W.PET/CERES
100 CREAM (GRAM) TOPICAL DAILY
Qty: 90 TABLET | Refills: 3 | Status: SHIPPED | OUTPATIENT
Start: 2024-07-18 | End: 2025-07-18

## 2024-07-18 RX ORDER — FOLIC ACID 1 MG/1
1 TABLET ORAL DAILY
Qty: 90 TABLET | Refills: 3 | Status: SHIPPED | OUTPATIENT
Start: 2024-07-18 | End: 2025-07-18

## 2024-07-29 ENCOUNTER — APPOINTMENT (OUTPATIENT)
Dept: CARDIOLOGY | Facility: CLINIC | Age: 69
End: 2024-07-29
Payer: MEDICARE

## 2024-07-29 VITALS
DIASTOLIC BLOOD PRESSURE: 60 MMHG | RESPIRATION RATE: 16 BRPM | SYSTOLIC BLOOD PRESSURE: 132 MMHG | BODY MASS INDEX: 31.28 KG/M2 | HEART RATE: 88 BPM | OXYGEN SATURATION: 95 % | WEIGHT: 218 LBS

## 2024-07-29 DIAGNOSIS — Z78.9 ALCOHOL USE: ICD-10-CM

## 2024-07-29 DIAGNOSIS — I47.10 SVT (SUPRAVENTRICULAR TACHYCARDIA) (CMS-HCC): Primary | ICD-10-CM

## 2024-07-29 PROCEDURE — 1157F ADVNC CARE PLAN IN RCRD: CPT | Performed by: NURSE PRACTITIONER

## 2024-07-29 PROCEDURE — 3075F SYST BP GE 130 - 139MM HG: CPT | Performed by: NURSE PRACTITIONER

## 2024-07-29 PROCEDURE — 3078F DIAST BP <80 MM HG: CPT | Performed by: NURSE PRACTITIONER

## 2024-07-29 PROCEDURE — 1159F MED LIST DOCD IN RCRD: CPT | Performed by: NURSE PRACTITIONER

## 2024-07-29 PROCEDURE — 99213 OFFICE O/P EST LOW 20 MIN: CPT | Performed by: NURSE PRACTITIONER

## 2024-07-29 RX ORDER — FOLIC ACID 1 MG/1
1 TABLET ORAL DAILY
Qty: 90 TABLET | Refills: 3 | Status: SHIPPED | OUTPATIENT
Start: 2024-07-29 | End: 2025-07-29

## 2024-07-29 RX ORDER — METOPROLOL TARTRATE 25 MG/1
25 TABLET, FILM COATED ORAL 2 TIMES DAILY
Qty: 180 TABLET | Refills: 3 | Status: SHIPPED | OUTPATIENT
Start: 2024-07-29 | End: 2025-07-29

## 2024-07-29 RX ORDER — LANOLIN ALCOHOL/MO/W.PET/CERES
100 CREAM (GRAM) TOPICAL DAILY
Qty: 90 TABLET | Refills: 3 | Status: SHIPPED | OUTPATIENT
Start: 2024-07-29 | End: 2025-07-29

## 2024-07-29 NOTE — PROGRESS NOTES
Easton Ragland is a 69 y.o. male     History Of Present Illness   Mr Ragland is a 69 year old male here with CAD, hypertension, hyperlipidemia, H/O TIA and cardiomyopathy, here as a follow up for complaints of right subclavian stenosis, dizziness and unsteadiness. His autonomic testing was previously cancelled due to concern of right subclavian stenosis, however upper extremities PVR were completed and his autonomic testing has been rescheduled.  He underwent a holter monitor and I have reviewed the results with him.  In addition, he underwent a CT scan of his chest and was noted to have severe CAD.  He did under go a NM stress in October that was negative for ischemia.  He is no longer able to exercise or do stretching exercises due to dizziness.  He also sustained a fall two weeks ago and injured his left elbow      Social HX  Social History     Tobacco Use    Smoking status: Some Days     Types: Cigars    Smokeless tobacco: Never   Vaping Use    Vaping status: Never Used   Substance Use Topics    Alcohol use: Yes     Alcohol/week: 6.0 standard drinks of alcohol     Types: 6 Shots of liquor per week    Drug use: Yes     Frequency: 2.0 times per week     Types: Marijuana     Comment: gummies          Family HX  No family history on file.       Review Of Systems   Constitutional: not feeling tired.   Eyes: no eyesight problems.  No vision loss or change in vision  ENT: no hearing loss and no nosebleeds.   Cardiovascular: No intermittent leg claudication,   No chest pain, no tightness or heavy pressure  No shortness of breath,  No palpitations,  No lower extremity edema  The heart rate is regular  Respiratory: no chronic cough and no shortness of breath.   Gastrointestinal: no change in bowel habits and no blood in stools.   Genitourinary: no urinary frequency.   Skin: no skin rashes.   Neurological: No frequent falls.   + dizziness, feeling light headed  + weakness  Denies headaches  Psychiatric: no depression and  not suicidal.   All other systems have been reviewed and are negative for complaint.        Allergies  Allergies   Allergen Reactions    Penicillins Hives and Itching    Clarithromycin Unknown    Bee Pollen Runny nose          Vitals  There were no vitals taken for this visit.        Physical Exam  Constitutional: alert and in no acute distress.   Eyes: no erythema, swelling or discharge from the eye .   Neck: neck is supple, symmetric, trachea midline, no masses  and no thyromegaly .   Pulmonary: No increased work of breathing or signs of respiratory distress    Lungs clear to auscultation.    No friction rub.  Cardiovascular: carotid pulses 2+ bilaterally with no bruit    JVP was normal, no thrills ,   Regular rhythm, normal S1 and S2, no murmurs    Pedal pulses 2+ bilaterally    No edema .   Abdomen: abdomen non-tender, no masses  and no hepatomegaly . No pulsatile mass noted  Skin: skin warm and dry, normal skin turgor .   Psychiatric judgment and insight is normal  and oriented to person, place and time .           Current/Home Meds    Current Outpatient Medications:     allopurinol (Zyloprim) 100 mg tablet, TAKE 1 TABLET DAILY, Disp: 90 tablet, Rfl: 3    ascorbic acid, vitamin C, 500 mg capsule, Take by mouth., Disp: , Rfl:     aspirin 81 mg EC tablet, Take 1 tablet (81 mg) by mouth once daily., Disp: , Rfl:     atorvastatin (Lipitor) 40 mg tablet, TAKE 1 TABLET AT BEDTIME, Disp: 90 tablet, Rfl: 3    co-enzyme Q-10 50 mg capsule, Take 2 capsules (100 mg) by mouth once daily., Disp: , Rfl:     docosahexaenoic acid/epa (FISH OIL ORAL), Fish Oil OIL  Refills: 0     Active, Disp: , Rfl:     folic acid (Folvite) 1 mg tablet, Take 1 tablet (1 mg) by mouth once daily., Disp: 90 tablet, Rfl: 3    glucosamine/chondr ambrose A sod (OSTEO BI-FLEX ORAL), Take by mouth., Disp: , Rfl:     multivitamin (MULTIPLE VITAMINS ORAL), Take by mouth early in the morning.., Disp: , Rfl:     omeprazole (PriLOSEC) 40 mg DR capsule, Take 1  capsule by mouth 30 minutes before your biggest meal, or before breakfast daily. Do not crush or chew., Disp: 90 capsule, Rfl: 3    sodium,potassium,mag sulfates (Suprep) 17.5-3.13-1.6 gram recon soln solution, Take 1 bottle by mouth 2 times a day. Take one bottle twice as directed by the prep instructions (Patient not taking: Reported on 6/25/2024), Disp: 354 mL, Rfl: 0    thiamine 100 mg tablet, Take 1 tablet (100 mg) by mouth once daily., Disp: 90 tablet, Rfl: 3    valsartan-hydrochlorothiazide (Diovan-HCT) 80-12.5 mg tablet, TAKE 1 TABLET DAILY, Disp: 90 tablet, Rfl: 3    Current Facility-Administered Medications:     Tc-99m tetrofosmin (Myoview) injection 23.3 millicurie, 23.3 millicurie, intravenous, Once in imaging, Liat Mccartney MD    Tc-99m tetrofosmin (Myoview) injection 8.3 millicurie, 8.3 millicurie, intravenous, Once in imaging, Liat Mccartney MD       Labs         Cardiac Service Results:  HOLTER MONITOR:  NSR with min HR of 58, max , average HR 82.  No episodes of a-fib, Brief episodes of SVT are present up to 22 beats in duration.  No episodes of high degree av block.  Frequent PVC with nonsustained vtach    Expand All Collapse All  Easton SARKIS Ragland is a 69 y.o. male     History Of Present Illness   Mr Ragland is a 69 year old male here with CAD, hypertension, hyperlipidemia, H/O TIA and cardiomyopathy, here as a new consult for complaints of right subclavian stenosis, dizziness and unsteadiness.  He is also followed by neurology and was instructed to under go a tilt table test, however this was cancelled when he was found to have the subclavian stenosis.           Social HX  Social History   Social History            Tobacco Use    Smoking status: Some Days       Types: Cigars    Smokeless tobacco: Never   Substance Use Topics    Alcohol use: Yes       Alcohol/week: 6.0 standard drinks of alcohol       Types: 6 Shots of liquor per week    Drug use: Yes       Frequency: 2.0 times per week       Comment:  gummies               Family HX  Family History   No family history on file.            Review Of Systems   Constitutional: not feeling tired.   Eyes: no eyesight problems.  No vision loss or change in vision  ENT: no hearing loss and no nosebleeds.   Cardiovascular: No intermittent leg claudication,   No chest pain, no tightness or heavy pressure  No shortness of breath,  No palpitations,  No lower extremity edema  The heart rate is regular  Respiratory: no chronic cough and no shortness of breath.   Gastrointestinal: no change in bowel habits and no blood in stools.   Genitourinary: no urinary frequency.   Skin: no skin rashes.   Neurological: No frequent falls.   + dizziness, feeling light headed  + weakness  Denies headaches  Psychiatric: no depression and not suicidal.   All other systems have been reviewed and are negative for complaint.       Allergies  RX Allergies        Allergies   Allergen Reactions    Penicillins Hives and Itching    Clarithromycin Unknown    Bee Pollen Runny nose               Vitals  There were no vitals taken for this visit.          Physical Exam  Constitutional: alert and in no acute distress.   Eyes: no erythema, swelling or discharge from the eye .   Neck: neck is supple, symmetric, trachea midline, no masses  and no thyromegaly .   Pulmonary: No increased work of breathing or signs of respiratory distress    Lungs clear to auscultation.    No friction rub.  Cardiovascular: carotid pulses 2+ bilaterally with no bruit    JVP was normal, no thrills ,   Regular rhythm, normal S1 and S2, no murmurs    Pedal pulses 2+ bilaterally   Triphasic right radial pulse   No edema .   Abdomen: abdomen non-tender, no masses  and no hepatomegaly . No pulsatile mass noted  Skin: skin warm and dry, normal skin turgor .   Psychiatric judgment and insight is normal  and oriented to person, place and time .             Current/Home Meds    Current Medications      Current Outpatient Medications:      allopurinol (Zyloprim) 100 mg tablet, TAKE 1 TABLET DAILY, Disp: 90 tablet, Rfl: 0    ascorbic acid, vitamin C, 500 mg capsule, Take by mouth., Disp: , Rfl:     aspirin 81 mg EC tablet, Take 1 tablet (81 mg) by mouth once daily., Disp: , Rfl:     atorvastatin (Lipitor) 40 mg tablet, TAKE 1 TABLET AT BEDTIME, Disp: 90 tablet, Rfl: 3    docosahexaenoic acid/epa (FISH OIL ORAL), Fish Oil OIL  Refills: 0     Active, Disp: , Rfl:     glucosamine/chondr ambrose A sod (OSTEO BI-FLEX ORAL), Take by mouth., Disp: , Rfl:     multivitamin (MULTIPLE VITAMINS ORAL), Take by mouth early in the morning.., Disp: , Rfl:     NON FORMULARY, Herbal Supplement, Disp: , Rfl:     omeprazole (PriLOSEC) 40 mg DR capsule, Take 1 capsule by mouth 30 minutes before your biggest meal, or before breakfast daily. Do not crush or chew., Disp: 90 capsule, Rfl: 3    valsartan-hydrochlorothiazide (Diovan-HCT) 80-12.5 mg tablet, TAKE 1 TABLET DAILY, Disp: 90 tablet, Rfl: 0     Current Facility-Administered Medications:     Tc-99m tetrofosmin (Myoview) injection 23.3 millicurie, 23.3 millicurie, intravenous, Once in imaging, Liat Mccartney MD    Tc-99m tetrofosmin (Myoview) injection 8.3 millicurie, 8.3 millicurie, intravenous, Once in imaging, Liat Mccartney MD         Cardiac Service Results:  TST 10/9/2023 negative EKG, below average capacity, frequent PVCs, no symptoms, nuclear images normal ejection fraction 60%     Echo 10/9/2023 EF 55 to 60%, DDF     AA U/S [01/11/2021]: No evidence of aneurysm.     ECHO [07/07/2020]: EF 55-60%. SD = impaired relaxation pattern. Aorta mildly dial (3.9 cm). Atrial septal aneurysm present.     NST [02/05/2020]: Normal. There is mildly reduced LVSF w/ex EF 49%.     CAC (11/13/2019) score 594 aga units. Severe disease. Extensive calcified plaque w/in Prox and Mid L anterior descending coronary artery.      CAROTID (05/21/2015) [place] & [place] 16-49% stenosis      Assessment/Plan      SVT/Vtach:  Holter monitor results show several  episodes of SVT/Vtach.  /60.  Will start metoprolol tartrate 25 mg BID and have him monitor his HR and BP.  He will follow up with me in 4-6 weeks.  If no relief in his symptoms, will plan a referral to EP for further evaluation and treatment.

## 2024-08-07 ENCOUNTER — APPOINTMENT (OUTPATIENT)
Dept: RESPIRATORY THERAPY | Facility: HOSPITAL | Age: 69
End: 2024-08-07
Payer: MEDICARE

## 2024-08-12 ENCOUNTER — HOSPITAL ENCOUNTER (OUTPATIENT)
Dept: RESPIRATORY THERAPY | Facility: HOSPITAL | Age: 69
Discharge: HOME | End: 2024-08-12
Payer: MEDICARE

## 2024-08-12 DIAGNOSIS — R05.3 CHRONIC COUGH: ICD-10-CM

## 2024-08-12 LAB
MGC ASCENT PFT - FEV1 - POST: 3.4
MGC ASCENT PFT - FEV1 - POST: 3.4
MGC ASCENT PFT - FEV1 - PRE: 3.27
MGC ASCENT PFT - FEV1 - PRE: 3.27
MGC ASCENT PFT - FEV1 - PREDICTED: 3.03
MGC ASCENT PFT - FEV1 - PREDICTED: 3.03
MGC ASCENT PFT - FVC - POST: 3.88
MGC ASCENT PFT - FVC - POST: 3.88
MGC ASCENT PFT - FVC - PRE: 4.11
MGC ASCENT PFT - FVC - PRE: 4.11
MGC ASCENT PFT - FVC - PREDICTED: 4.02
MGC ASCENT PFT - FVC - PREDICTED: 4.02

## 2024-08-12 PROCEDURE — 94726 PLETHYSMOGRAPHY LUNG VOLUMES: CPT

## 2024-08-14 ENCOUNTER — HOSPITAL ENCOUNTER (OUTPATIENT)
Dept: NEUROLOGY | Facility: CLINIC | Age: 69
Discharge: HOME | End: 2024-08-14
Payer: MEDICARE

## 2024-08-14 DIAGNOSIS — R42 DIZZINESS: ICD-10-CM

## 2024-08-14 PROCEDURE — 95923 AUTONOMIC NRV SYST FUNJ TEST: CPT | Performed by: PSYCHIATRY & NEUROLOGY

## 2024-08-14 PROCEDURE — 95924 ANS PARASYMP & SYMP W/TILT: CPT | Performed by: PSYCHIATRY & NEUROLOGY

## 2024-09-24 ENCOUNTER — OFFICE VISIT (OUTPATIENT)
Dept: PULMONOLOGY | Facility: CLINIC | Age: 69
End: 2024-09-24
Payer: MEDICARE

## 2024-09-24 VITALS — TEMPERATURE: 97.8 F | DIASTOLIC BLOOD PRESSURE: 84 MMHG | OXYGEN SATURATION: 97 % | SYSTOLIC BLOOD PRESSURE: 138 MMHG

## 2024-09-24 DIAGNOSIS — Z77.090 ASBESTOS EXPOSURE: ICD-10-CM

## 2024-09-24 DIAGNOSIS — R05.3 CHRONIC COUGH: Primary | ICD-10-CM

## 2024-09-24 PROCEDURE — 99214 OFFICE O/P EST MOD 30 MIN: CPT | Performed by: NURSE PRACTITIONER

## 2024-09-24 PROCEDURE — 3075F SYST BP GE 130 - 139MM HG: CPT | Performed by: NURSE PRACTITIONER

## 2024-09-24 PROCEDURE — 3079F DIAST BP 80-89 MM HG: CPT | Performed by: NURSE PRACTITIONER

## 2024-09-24 PROCEDURE — 1159F MED LIST DOCD IN RCRD: CPT | Performed by: NURSE PRACTITIONER

## 2024-09-24 PROCEDURE — 1157F ADVNC CARE PLAN IN RCRD: CPT | Performed by: NURSE PRACTITIONER

## 2024-09-24 ASSESSMENT — ENCOUNTER SYMPTOMS
AGITATION: 0
WEAKNESS: 0
RHINORRHEA: 0
ABDOMINAL PAIN: 0
NAUSEA: 0
ARTHRALGIAS: 0
SINUS PRESSURE: 1
FATIGUE: 0
MYALGIAS: 0
VOICE CHANGE: 0
EYE PAIN: 0
DIAPHORESIS: 1
FEVER: 0
DIARRHEA: 0
DIZZINESS: 0
NUMBNESS: 0
VOMITING: 0
JOINT SWELLING: 0
DEPRESSION: 0
NERVOUS/ANXIOUS: 0
HEADACHES: 0
BACK PAIN: 1
PALPITATIONS: 0

## 2024-09-24 NOTE — PROGRESS NOTES
Patient: Easton Ragland    [id number]  : 1955 -- AGE 69 y.o.    Provider: MOISE Simmons     Location [facility]   Service Date: 2024              [facility]  Follow up Visit Note      HISTORY OF PRESENT ILLNESS     The patient's referring provider is: No ref. provider found    HISTORY OF PRESENT ILLNESS   Easton Ragland is a 69 y.o. male who presents to a [facility] for an evaluation with concerns of Follow-up. I have independently interviewed and examined the patient in the office and reviewed available records.    PCP: Dr. Zhu   GI: Dr. Pedroza   ENT: Mary Lou Rebolledo CNP   Cardiologist: Dr. Zapien -- Breonna CRANE     Current History    Since last visit   He feels the cough is a lot better - he has been doing the Xyzal at night and flonase in the morning and cough is 90% better. He continues to take the omeprazole. He feels the dizziness / balance issues are doing better - found out he had SVT/ Vtach. He started on metoprolol and compression stockings with improvement in his balance. He has an exercise bike - he has been riding 15 miles a day.  He states the balance with walking is not bothersome like it was. He has noticed rare wheezing - more with bad coughing spells. He had a 20 sec episode of irregular beats - had coughing/ wheezing/ SOB episode during that time.  He denies any SOB at rest or CP. LYLE only with strenuous exercise - very active. He feels this is better since he has been able to do more. He continues to smoke cigar smoking - last about a month ago.     24: On today's visit, the patient reports he has been having issues with balance. He had seen neurology - US of his carotid arteries. He was supposed to have a tilt table test- given stenosis of subclavian found on US. Couldn't get in with his cardiologist as they requeted -- saw Breonna CRANE who ordered heart monitor as well  as PVRs. He had seen Dr. Pedroza with GI - ordered colonoscopy and EGD.  She was seen by ENT - significant sore throat from coughing. He states he started on omeprazole -- states sore throat was helpful for the sore throat, but still having a cough. He feels cough is still bothersome in the morning more so related to post nasal drip.  He has had pneumonia twice and bronchitis one.      He feels his post nasal drip drives his morning cough. He uses xyzal at night PRN and flonase PRN.  He has not tried doing both these daily every day. He states cough improved some previously when switched from lisinopril to valsartan. He was taking zyrtec daily prior to the xyzal.  He states he is concerned about taking meds if he drinking bourbon at night. He only notices LYLE with strenuous exercise - lifts weights and goes up/down the stairs (5x a day for exercise). He was walking 3 miles 3x a week - has not been walking as much because his wife hip/knee replacement. He is a little concerned for balance as well as sciatica - so this has limited his activity as well.  He denies any wheezing, SOB at rest, or CP. He denies any GERD.     Previous pulmonary history: He has no history of recurrent infections, or lung disease as a child.  He had no previous lung hx, never on oxygen or inhaler therapy.     Inhalers/nebulized medications: none     Hospitalization History: He has not been hospitalized over the last year for breathing related problem.    Sleep history:  Intermittent snoring.  He wakes up feeling rested - naps, but can doze off reading.     ALLERGIES AND MEDICATIONS     ALLERGIES  Allergies   Allergen Reactions    Penicillins Hives and Itching    Clarithromycin Unknown    Bee Pollen Runny nose       MEDICATIONS  Current Outpatient Medications   Medication Sig Dispense Refill    allopurinol (Zyloprim) 100 mg tablet TAKE 1 TABLET DAILY 90 tablet 3    ascorbic acid, vitamin C, 500 mg capsule Take by mouth.      aspirin 81 mg EC  tablet Take 1 tablet (81 mg) by mouth once daily.      atorvastatin (Lipitor) 40 mg tablet TAKE 1 TABLET AT BEDTIME 90 tablet 3    co-enzyme Q-10 50 mg capsule Take 2 capsules (100 mg) by mouth once daily.      docosahexaenoic acid/epa (FISH OIL ORAL) Fish Oil OIL   Refills: 0       Active      folic acid (Folvite) 1 mg tablet Take 1 tablet (1 mg) by mouth once daily. 90 tablet 3    glucosamine/chondr ambrose A sod (OSTEO BI-FLEX ORAL) Take by mouth.      metoprolol tartrate (Lopressor) 25 mg tablet Take 1 tablet by mouth 2 times a day. 180 tablet 3    multivitamin (MULTIPLE VITAMINS ORAL) Take by mouth early in the morning..      omeprazole (PriLOSEC) 40 mg DR capsule Take 1 capsule by mouth 30 minutes before your biggest meal, or before breakfast daily. Do not crush or chew. 90 capsule 3    sodium,potassium,mag sulfates (Suprep) 17.5-3.13-1.6 gram recon soln solution Take 1 bottle by mouth 2 times a day. Take one bottle twice as directed by the prep instructions 354 mL 0    thiamine 100 mg tablet Take 1 tablet (100 mg) by mouth once daily. 90 tablet 3    valsartan-hydrochlorothiazide (Diovan-HCT) 80-12.5 mg tablet TAKE 1 TABLET DAILY 90 tablet 3     Current Facility-Administered Medications   Medication Dose Route Frequency Provider Last Rate Last Admin    Tc-99m tetrofosmin (Myoview) injection 23.3 millicurie  23.3 millicurie intravenous Once in imaging Liat Mccartney MD        Tc-99m tetrofosmin (Myoview) injection 8.3 millicurie  8.3 millicurie intravenous Once in imaging Liat Mccartney MD             PAST HISTORY     PAST MEDICAL HISTORY  - HTN   - CAD   - gout   - HLD   - TIA ? CVA- 5/2015   - dizziness - right subclavian stenosis   - knee replacement   - vertigo   - sciatica   - DVT - 2010 - unprovoked - was on a few months of xarelto     PAST SURGICAL HISTORY  Past Surgical History:   Procedure Laterality Date    OTHER SURGICAL HISTORY  12/06/2019    Tonsillectomy with adenoidectomy    OTHER SURGICAL HISTORY  12/06/2019     Knee replacement       IMMUNIZATION HISTORY  Immunization History   Administered Date(s) Administered    Flu vaccine (IIV4), preservative free *Check age/dose* 11/29/2015, 10/12/2016    Flu vaccine, quadrivalent, high-dose, preservative free, age 65y+ (FLUZONE) 09/12/2020, 10/05/2021, 10/05/2022    Flu vaccine, quadrivalent, no egg protein, age 6 month or greater (FLUCELVAX) 11/12/2017    Flu vaccine, trivalent, preservative free, HIGH-DOSE, age 65y+ (Fluzone) 09/30/2018    Hepatitis B vaccine, 19 yrs and under (RECOMBIVAX, ENGERIX) 03/28/1994, 05/02/1994, 10/05/1994    Influenza, trivalent, adjuvanted 09/29/2019    Moderna COVID-19 vaccine, bivalent, blue cap/gray label *Check age/dose* 10/05/2022, 05/24/2023    Pneumococcal conjugate vaccine, 13-valent (PREVNAR 13) 10/29/2018    Pneumococcal polysaccharide vaccine, 23-valent, age 2 years and older (PNEUMOVAX 23) 12/29/2019    Tdap vaccine, age 7 year and older (BOOSTRIX, ADACEL) 08/04/2016, 07/03/2018    Zoster vaccine, recombinant, adult (SHINGRIX) 09/12/2020, 12/07/2020    Zoster, live 03/16/2017       SOCIAL HISTORY  Smoking: cigars for 30 years - when golfing 3-4 cigars a week (in summer).  More recently a cigar a month.    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     FAMILY HISTORY  FAMILY HISTORY: No family Hx of lung disease or lung cancer.    RESULTS/DATA     Pulmonary Function Test Results     8/12/24 - FEV1/FVC 0.79 (z 0.42)/ FEV1 3.40 (112% - no BD resp)/ FVC 3.88 (96%)/ TLC 5.46 (77%)/ DLCO 97%     FENO: 8/12/24 - 21ppb     Chest Radiograph     XR chest 2 view 12/13/2022    1. No acute cardiopulmonary process.      No orders to display        Chest CT Scan     12/26/21 - [facility] -   No CT evidence of pulmonary embolism within constraints of the exam.   Diffuse  "bronchial wall thickening secondary to bronchitis/bronchiolitis.    Scattered subsegmental atelectasis in the inferior lingula and bilateral   lower lobes.  Heterogeneous attenuation of the lung parenchyma with   groundglass changes in the lower lobes in part due to respiratory motion   and hypoinflation; nonspecific small airways inflammation is also   suspected in this patient with diffuse bronchial wall thickening.     7/16/24: Bilateral dependent atelectasis. Ground-glass airspace opacities  within the right-greater-than-left lower lobes is also favored to  reflect areas of atelectasis, although may reflect  infectious/inflammatory infiltrates given patient's history of a  chronic cough. Consider CT chest follow-up in 3 months for  re-evaluation after treatment.  2. No discrete suspicious noncalcified solid pulmonary nodules or  masses. No calcified or noncalcified pleural plaques suggestive of  sequela of asbestosis exposure.  3. Severe coronary artery calcifications.  4. Hepatic steatosis. Correlate with serum LFTs.           Echocardiogram     10/9/23 - Left ventricular systolic function is normal with a 55-60% estimated ejection fraction.   2. Spectral Doppler shows an impaired relaxation pattern of left ventricular diastolic filling.         Other testing/ Labs      Eosinophils Absolute (x10*3/uL)   Date Value   07/01/2024 0.27     No results found for: \"IGE\"    REVIEW OF SYSTEMS     REVIEW OF SYSTEMS  Review of Systems   Constitutional:  Positive for diaphoresis. Negative for fatigue and fever.   HENT:  Positive for sinus pressure. Negative for congestion, postnasal drip, rhinorrhea and voice change.    Eyes:  Negative for pain and visual disturbance.   Cardiovascular:  Negative for chest pain, palpitations and leg swelling.   Gastrointestinal:  Negative for abdominal pain, diarrhea, nausea and vomiting.   Endocrine: Negative for cold intolerance and heat intolerance.   Musculoskeletal:  Positive for back " pain. Negative for arthralgias, joint swelling and myalgias.   Skin:  Negative for rash.   Neurological:  Negative for dizziness, weakness, numbness and headaches.   Psychiatric/Behavioral:  Negative for agitation. The patient is not nervous/anxious.          PHYSICAL EXAM     VITAL SIGNS: There were no vitals taken for this visit.     CURRENT WEIGHT: [unfilled]  BMI: [unfilled]  PREVIOUS WEIGHTS:  Wt Readings from Last 3 Encounters:   07/29/24 98.9 kg (218 lb)   06/25/24 95.7 kg (211 lb)   06/21/24 96.6 kg (213 lb)       Physical Exam  Vitals reviewed.   Constitutional:       General: He is not in acute distress.     Appearance: Normal appearance. He is not ill-appearing or toxic-appearing.   HENT:      Head: Normocephalic.      Nose: No rhinorrhea.   Cardiovascular:      Rate and Rhythm: Normal rate and regular rhythm.      Heart sounds: Normal heart sounds.   Pulmonary:      Effort: Pulmonary effort is normal. No respiratory distress.      Breath sounds: Normal breath sounds. No stridor. No wheezing, rhonchi or rales.   Abdominal:      General: Abdomen is flat.   Musculoskeletal:         General: Normal range of motion.      Right lower leg: No edema.      Left lower leg: No edema.   Skin:     General: Skin is warm and dry.      Nails: There is no clubbing.   Neurological:      General: No focal deficit present.      Mental Status: He is alert and oriented to person, place, and time.   Psychiatric:         Mood and Affect: Mood normal.         Behavior: Behavior normal.         Judgment: Judgment normal.         ASSESSMENT/PLAN     Chronic cough: Improvement with both PPI as well as xyzal/flonase- likely multifactorial. 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 Cigar smoker for 30 years.  CT in 2021 with inflammation/ bronchial wall thickening -- now with atelectasis. PFTs without obstruction. CT chest - asbestos exposure - with atelectasis / scarring from previous pneumonias- no follow up needed (reviewed with   Young).   - continue levocetirizine (Xyzal) and flonase daily   - continue omeprazole (prilosec) daily     2. Cigar smoking:   - discussed smoking cessation for 3 minutes       Thank you for visiting the Pulmonary clinic today!   Return to clinic  as needed   Meredith Barragan CNP  My office -  [phone]- Malika is my nurse.   Radiology scheduling [phone]   Appointment scheduling [phone]   Pulmonary function testing - [phone]

## 2024-09-24 NOTE — PATIENT INSTRUCTIONS
Chronic cough: Improvement with both PPI as well as xyzal/flonase- likely multifactorial. 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 Cigar smoker for 30 years.  CT in 2021 with inflammation/ bronchial wall thickening -- now with atelectasis. PFTs without obstruction. CT chest - asbestos exposure - with atelectasis   - continue levocetirizine (Xyzal) and flonase daily   - continue omeprazole (prilosec) daily     2. Cigar smoking:   - discussed smoking cessation for 3 minutes       Thank you for visiting the Pulmonary clinic today!   Return to clinic  as needed   Meredith Barragan CNP  My office -  [phone]- Malika is my nurse.   Radiology scheduling [phone]   Appointment scheduling [phone]   Pulmonary function testing - [phone]

## 2024-09-25 ENCOUNTER — APPOINTMENT (OUTPATIENT)
Dept: CARDIOLOGY | Facility: CLINIC | Age: 69
End: 2024-09-25
Payer: MEDICARE

## 2024-09-25 VITALS
BODY MASS INDEX: 29.27 KG/M2 | HEART RATE: 72 BPM | SYSTOLIC BLOOD PRESSURE: 144 MMHG | DIASTOLIC BLOOD PRESSURE: 84 MMHG | WEIGHT: 204 LBS

## 2024-09-25 DIAGNOSIS — I10 BENIGN ESSENTIAL HYPERTENSION: Chronic | ICD-10-CM

## 2024-09-25 DIAGNOSIS — I25.10 CORONARY ARTERY DISEASE INVOLVING NATIVE CORONARY ARTERY OF NATIVE HEART WITHOUT ANGINA PECTORIS: Primary | Chronic | ICD-10-CM

## 2024-09-25 DIAGNOSIS — I42.0 CARDIOMYOPATHY, DILATED (MULTI): Chronic | ICD-10-CM

## 2024-09-25 DIAGNOSIS — E78.00 HYPERCHOLESTEROLEMIA: Chronic | ICD-10-CM

## 2024-09-25 PROCEDURE — 3077F SYST BP >= 140 MM HG: CPT | Performed by: NURSE PRACTITIONER

## 2024-09-25 PROCEDURE — 99212 OFFICE O/P EST SF 10 MIN: CPT | Performed by: NURSE PRACTITIONER

## 2024-09-25 PROCEDURE — 1159F MED LIST DOCD IN RCRD: CPT | Performed by: NURSE PRACTITIONER

## 2024-09-25 PROCEDURE — 3079F DIAST BP 80-89 MM HG: CPT | Performed by: NURSE PRACTITIONER

## 2024-09-25 PROCEDURE — 1157F ADVNC CARE PLAN IN RCRD: CPT | Performed by: NURSE PRACTITIONER

## 2024-09-25 NOTE — PROGRESS NOTES
Easton Ragland is a 69 y.o. male     History Of Present Illness   Mr Ragland is a 69 year old male here with CAD, hypertension, hyperlipidemia, H/O TIA and cardiomyopathy, here as a follow up for complaints of right subclavian stenosis, dizziness and unsteadiness. His autonomic testing was previously cancelled due to concern of right subclavian stenosis, however upper extremities PVR were completed and his autonomic testing was rescheduled.  His autonomic testing was negative for tachycardia or hypotension.  He underwent a holter monitor and I have reviewed the results with him.  In addition, he underwent a CT scan of his chest and was noted to have severe CAD.  He did under go a NM stress in October that was negative for ischemia.  He previously sustained a fall due to dizziness, however since starting metoprolol and wearing compression stockings, he has had no further episodes of dizziness, syncope or falls.  He restarted exercising on his stationary bike and does stretching and weight lifting 6 x a week with no complaints.    Social HX  Social History     Tobacco Use    Smoking status: Some Days     Types: Cigars    Smokeless tobacco: Never   Vaping Use    Vaping status: Never Used   Substance Use Topics    Alcohol use: Yes     Alcohol/week: 6.0 standard drinks of alcohol     Types: 6 Shots of liquor per week    Drug use: Yes     Frequency: 2.0 times per week     Types: Marijuana     Comment: gummies          Family HX  No family history on file.       Review Of Systems   Constitutional: not feeling tired.   Eyes: no eyesight problems.  No vision loss or change in vision  ENT: no hearing loss and no nosebleeds.   Cardiovascular: No intermittent leg claudication,   No chest pain  No chest tightness   Np  heavy pressure  No shortness of breath,  No palpitations,  No lower extremity edema  The heart rate is regular  Respiratory: no chronic cough   No shortness of breath.   Gastrointestinal: no change in bowel habits  and no blood in stools.   Genitourinary: no urinary frequency.   Skin: no skin rashes.   Neurological: No frequent falls.   No dizziness  No weakness  Denies headaches  Psychiatric: no depression and not suicidal.   Musculoskeletal: No joint pain  No Myalgia pain       Allergies  Allergies   Allergen Reactions    Penicillins Hives and Itching    Clarithromycin Unknown    Bee Pollen Runny nose          Vitals  Visit Vitals  /84 (BP Location: Left arm, Patient Position: Sitting)   Pulse 72           Physical Exam  Constitutional: alert and in no acute distress.   Eyes: no erythema, swelling or discharge from the eye .   Neck: neck is supple, symmetric, trachea midline, no masses  and no thyromegaly .   Pulmonary: No increased work of breathing or signs of respiratory distress    Lungs clear to auscultation.    No friction rub.  Cardiovascular: carotid pulses 2+ bilaterally with no bruit    JVP was normal, no thrills ,   Regular rhythm, normal S1 and S2, no murmurs    Pedal pulses 2+ bilaterally    No edema .   Abdomen: abdomen non-tender, no masses  and no hepatomegaly . No pulsatile mass noted  Skin: skin warm and dry, normal skin turgor .   Psychiatric judgment and insight is normal  and oriented to person, place and time .           Current/Home Meds    Current Outpatient Medications:     allopurinol (Zyloprim) 100 mg tablet, TAKE 1 TABLET DAILY, Disp: 90 tablet, Rfl: 3    ascorbic acid, vitamin C, 500 mg capsule, Take by mouth., Disp: , Rfl:     aspirin 81 mg EC tablet, Take 1 tablet (81 mg) by mouth once daily., Disp: , Rfl:     atorvastatin (Lipitor) 40 mg tablet, TAKE 1 TABLET AT BEDTIME, Disp: 90 tablet, Rfl: 3    co-enzyme Q-10 50 mg capsule, Take 2 capsules (100 mg) by mouth once daily., Disp: , Rfl:     docosahexaenoic acid/epa (FISH OIL ORAL), Fish Oil OIL  Refills: 0     Active, Disp: , Rfl:     folic acid (Folvite) 1 mg tablet, Take 1 tablet (1 mg) by mouth once daily., Disp: 90 tablet, Rfl: 3     glucosamine/chondr ambrose A sod (OSTEO BI-FLEX ORAL), Take by mouth., Disp: , Rfl:     metoprolol tartrate (Lopressor) 25 mg tablet, Take 1 tablet by mouth 2 times a day., Disp: 180 tablet, Rfl: 3    multivitamin (MULTIPLE VITAMINS ORAL), Take by mouth early in the morning.., Disp: , Rfl:     omeprazole (PriLOSEC) 40 mg DR capsule, Take 1 capsule by mouth 30 minutes before your biggest meal, or before breakfast daily. Do not crush or chew., Disp: 90 capsule, Rfl: 3    thiamine 100 mg tablet, Take 1 tablet (100 mg) by mouth once daily., Disp: 90 tablet, Rfl: 3    valsartan-hydrochlorothiazide (Diovan-HCT) 80-12.5 mg tablet, TAKE 1 TABLET DAILY, Disp: 90 tablet, Rfl: 3    sodium,potassium,mag sulfates (Suprep) 17.5-3.13-1.6 gram recon soln solution, Take 1 bottle by mouth 2 times a day. Take one bottle twice as directed by the prep instructions (Patient not taking: Reported on 9/25/2024), Disp: 354 mL, Rfl: 0    Current Facility-Administered Medications:     Tc-99m tetrofosmin (Myoview) injection 23.3 millicurie, 23.3 millicurie, intravenous, Once in imaging, Liat Mccartney MD    Tc-99m tetrofosmin (Myoview) injection 8.3 millicurie, 8.3 millicurie, intravenous, Once in imaging, Liat Mccartney MD       Labs                 Cardiac Service Results:  10/9/2023 NM STRESS  1. Normal stress myocardial perfusion imaging.   2. Well-maintained left ventricular function.  60%     7/17/2023 CT WO CONTRAST   1.  Bilateral dependent atelectasis. Ground-glass airspace opacities  within the right-greater-than-left lower lobes is also favored to  reflect areas of atelectasis, although may reflect  infectious/inflammatory infiltrates given patient's history of a  chronic cough. Consider CT chest follow-up in 3 months for  re-evaluation after treatment.  2. No discrete suspicious noncalcified solid pulmonary nodules or  masses. No calcified or noncalcified pleural plaques suggestive of  sequela of asbestosis exposure.  3. Severe coronary artery  calcifications.  4. Hepatic steatosis. Correlate with serum LFTs.    HOLTER MONITOR:  NSR with min HR of 58, max , average HR 82.  No episodes of a-fib, Brief episodes of SVT are present up to 22 beats in duration.  No episodes of high degree av block.  Frequent PVC with nonsustained vtach        Assessment/Plan      Mr Ragland is a 69 year old male here with CAD, hypertension, hyperlipidemia, H/O TIA and cardiomyopathy, here to review the results of his autonomic testing that was negative for significant orthostatic hypotension or tachycardia.  He is currently tolerating metoprolol well and wearing compression stockings and exercising daily.  His previous NM stress test was negative for ischemia.  CTA of the chest showed severe CAD.  He is to continue asa, statin and tight BP control.  He is to continue his exercise regimen and increase his water intake.  He will follow up with Dr Mccartney in 6 months or sooner for any questions or concerns,

## 2024-10-21 DIAGNOSIS — K21.9 LARYNGOPHARYNGEAL REFLUX (LPR): ICD-10-CM

## 2024-10-21 RX ORDER — OMEPRAZOLE 40 MG/1
CAPSULE, DELAYED RELEASE ORAL
Qty: 90 CAPSULE | Refills: 3 | Status: SHIPPED | OUTPATIENT
Start: 2024-10-21

## 2024-11-07 ENCOUNTER — ANESTHESIA EVENT (OUTPATIENT)
Dept: GASTROENTEROLOGY | Facility: HOSPITAL | Age: 69
End: 2024-11-07
Payer: MEDICARE

## 2024-11-07 ENCOUNTER — HOSPITAL ENCOUNTER (OUTPATIENT)
Dept: GASTROENTEROLOGY | Facility: HOSPITAL | Age: 69
Discharge: HOME | End: 2024-11-07
Payer: MEDICARE

## 2024-11-07 ENCOUNTER — ANESTHESIA (OUTPATIENT)
Dept: GASTROENTEROLOGY | Facility: HOSPITAL | Age: 69
End: 2024-11-07
Payer: MEDICARE

## 2024-11-07 VITALS
RESPIRATION RATE: 16 BRPM | OXYGEN SATURATION: 98 % | TEMPERATURE: 97 F | BODY MASS INDEX: 29.19 KG/M2 | DIASTOLIC BLOOD PRESSURE: 71 MMHG | WEIGHT: 203.93 LBS | SYSTOLIC BLOOD PRESSURE: 139 MMHG | HEIGHT: 70 IN | HEART RATE: 69 BPM

## 2024-11-07 DIAGNOSIS — Z12.11 COLON CANCER SCREENING: ICD-10-CM

## 2024-11-07 DIAGNOSIS — R05.3 CHRONIC COUGH: ICD-10-CM

## 2024-11-07 DIAGNOSIS — R12 HEARTBURN: ICD-10-CM

## 2024-11-07 PROCEDURE — 3700000002 HC GENERAL ANESTHESIA TIME - EACH INCREMENTAL 1 MINUTE

## 2024-11-07 PROCEDURE — A45385 PR COLONOSCOPY,REMV LESN,SNARE: Performed by: ANESTHESIOLOGY

## 2024-11-07 PROCEDURE — 99100 ANES PT EXTEME AGE<1 YR&>70: CPT | Performed by: ANESTHESIOLOGY

## 2024-11-07 PROCEDURE — 43239 EGD BIOPSY SINGLE/MULTIPLE: CPT | Performed by: STUDENT IN AN ORGANIZED HEALTH CARE EDUCATION/TRAINING PROGRAM

## 2024-11-07 PROCEDURE — A45385 PR COLONOSCOPY,REMV LESN,SNARE: Performed by: NURSE ANESTHETIST, CERTIFIED REGISTERED

## 2024-11-07 PROCEDURE — 3700000001 HC GENERAL ANESTHESIA TIME - INITIAL BASE CHARGE

## 2024-11-07 PROCEDURE — 45385 COLONOSCOPY W/LESION REMOVAL: CPT | Performed by: STUDENT IN AN ORGANIZED HEALTH CARE EDUCATION/TRAINING PROGRAM

## 2024-11-07 PROCEDURE — 2500000004 HC RX 250 GENERAL PHARMACY W/ HCPCS (ALT 636 FOR OP/ED): Performed by: NURSE ANESTHETIST, CERTIFIED REGISTERED

## 2024-11-07 PROCEDURE — 7100000010 HC PHASE TWO TIME - EACH INCREMENTAL 1 MINUTE

## 2024-11-07 PROCEDURE — 7100000009 HC PHASE TWO TIME - INITIAL BASE CHARGE

## 2024-11-07 RX ORDER — SODIUM CHLORIDE, SODIUM LACTATE, POTASSIUM CHLORIDE, CALCIUM CHLORIDE 600; 310; 30; 20 MG/100ML; MG/100ML; MG/100ML; MG/100ML
20 INJECTION, SOLUTION INTRAVENOUS CONTINUOUS
Status: DISCONTINUED | OUTPATIENT
Start: 2024-11-07 | End: 2024-11-08 | Stop reason: HOSPADM

## 2024-11-07 RX ORDER — KETAMINE HCL IN NACL, ISO-OSM 100MG/10ML
SYRINGE (ML) INJECTION AS NEEDED
Status: DISCONTINUED | OUTPATIENT
Start: 2024-11-07 | End: 2024-11-07

## 2024-11-07 RX ORDER — ONDANSETRON HYDROCHLORIDE 2 MG/ML
4 INJECTION, SOLUTION INTRAVENOUS ONCE AS NEEDED
Status: DISCONTINUED | OUTPATIENT
Start: 2024-11-07 | End: 2024-11-08 | Stop reason: HOSPADM

## 2024-11-07 RX ORDER — LIDOCAINE HCL/PF 100 MG/5ML
SYRINGE (ML) INTRAVENOUS AS NEEDED
Status: DISCONTINUED | OUTPATIENT
Start: 2024-11-07 | End: 2024-11-07

## 2024-11-07 RX ORDER — PROPOFOL 10 MG/ML
INJECTION, EMULSION INTRAVENOUS AS NEEDED
Status: DISCONTINUED | OUTPATIENT
Start: 2024-11-07 | End: 2024-11-07

## 2024-11-07 SDOH — HEALTH STABILITY: MENTAL HEALTH: CURRENT SMOKER: 0

## 2024-11-07 ASSESSMENT — PAIN DESCRIPTION - DESCRIPTORS: DESCRIPTORS: SORE

## 2024-11-07 ASSESSMENT — PAIN SCALES - GENERAL
PAINLEVEL_OUTOF10: 0 - NO PAIN
PAINLEVEL_OUTOF10: 0 - NO PAIN
PAINLEVEL_OUTOF10: 1
PAINLEVEL_OUTOF10: 0 - NO PAIN

## 2024-11-07 ASSESSMENT — PAIN - FUNCTIONAL ASSESSMENT: PAIN_FUNCTIONAL_ASSESSMENT: 0-10

## 2024-11-07 NOTE — DISCHARGE INSTRUCTIONS

## 2024-11-07 NOTE — ADDENDUM NOTE
Addendum  created 11/07/24 1335 by Farhat Hernandez MD    Review and Sign - Ready for Procedure

## 2024-11-07 NOTE — ANESTHESIA PREPROCEDURE EVALUATION
Patient: Easton Ragland    Procedure Information       Date/Time: 11/07/24 0930    Scheduled providers: Ravi Pedroza MD; Farhat Hernandez MD    Procedures:       EGD      COLONOSCOPY    Location: [facility]            Relevant Problems   Anesthesia (within normal limits)      Cardiac   (+) Benign essential hypertension   (+) CAD (coronary artery disease)   (+) Hypercholesterolemia   (+) Peripheral vascular disease (CMS-HCC)      Pulmonary   (+) SOB (shortness of breath) on exertion      Neuro   (+) Stenosis of carotid artery      Hematology   (+) DVT (deep venous thrombosis) (Multi)      ID   (+) Disease due to severe acute respiratory syndrome coronavirus 2 (SARS-CoV-2)       Clinical information reviewed:    Allergies  Meds               NPO Detail:  NPO/Void Status  Carbohydrate Drink Given Prior to Surgery? : N  Date of Last Liquid: 11/07/24  Time of Last Liquid: 0600  Date of Last Solid: 11/05/24  Time of Last Solid: 1800  Last Intake Type: Clear fluids         Physical Exam    Airway  Mallampati: II  TM distance: >3 FB  Neck ROM: full     Cardiovascular   Rhythm: regular  Rate: normal     Dental - normal exam     Pulmonary    Abdominal        Anesthesia Plan    History of general anesthesia?: yes  History of complications of general anesthesia?: no    ASA 3     MAC     The patient is not a current smoker.  Patient was not previously instructed to abstain from smoking on day of procedure.  Patient did not smoke on day of procedure.    intravenous induction   Anesthetic plan and risks discussed with patient.  Use of blood products discussed with patient who.    Plan discussed with CRNA.

## 2024-11-07 NOTE — ANESTHESIA POSTPROCEDURE EVALUATION
Patient: Easton Ragland    Procedure Summary       Date: 11/07/24 Room / Location: [facility]    Anesthesia Start: 0935 Anesthesia Stop:     Procedures:       EGD      COLONOSCOPY Diagnosis:       Heartburn      Chronic cough      Colon cancer screening    Scheduled Providers: Ravi Pedroza MD; Farhat Hernandez MD Responsible Provider: Farhat Hernandez MD    Anesthesia Type: MAC ASA Status: 3            Anesthesia Type: MAC    Vitals Value Taken Time   /55 11/07/24 1036   Temp 36.0 11/07/24 1036   Pulse 71 11/07/24 1036   Resp 18 11/07/24 1036   SpO2 94% 11/07/24 1036       Anesthesia Post Evaluation    Patient location during evaluation: PACU  Patient participation: complete - patient participated  Level of consciousness: sleepy but conscious  Pain management: adequate  Airway patency: patent  Cardiovascular status: acceptable  Respiratory status: acceptable  Hydration status: acceptable  Postoperative Nausea and Vomiting: none        There were no known notable events for this encounter.

## 2024-11-13 LAB
LABORATORY COMMENT REPORT: NORMAL
PATH REPORT.FINAL DX SPEC: NORMAL
PATH REPORT.GROSS SPEC: NORMAL
PATH REPORT.RELEVANT HX SPEC: NORMAL
PATH REPORT.TOTAL CANCER: NORMAL

## 2024-11-14 ENCOUNTER — OFFICE VISIT (OUTPATIENT)
Dept: PRIMARY CARE | Facility: CLINIC | Age: 69
End: 2024-11-14
Payer: MEDICARE

## 2024-11-14 VITALS
WEIGHT: 204 LBS | OXYGEN SATURATION: 98 % | DIASTOLIC BLOOD PRESSURE: 90 MMHG | HEIGHT: 70 IN | BODY MASS INDEX: 29.2 KG/M2 | HEART RATE: 71 BPM | SYSTOLIC BLOOD PRESSURE: 165 MMHG

## 2024-11-14 DIAGNOSIS — M54.50 ACUTE RIGHT-SIDED LOW BACK PAIN WITHOUT SCIATICA: Primary | ICD-10-CM

## 2024-11-14 DIAGNOSIS — K21.9 LARYNGOPHARYNGEAL REFLUX (LPR): ICD-10-CM

## 2024-11-14 DIAGNOSIS — I10 BENIGN ESSENTIAL HYPERTENSION: Chronic | ICD-10-CM

## 2024-11-14 PROCEDURE — 1157F ADVNC CARE PLAN IN RCRD: CPT | Performed by: FAMILY MEDICINE

## 2024-11-14 PROCEDURE — 3080F DIAST BP >= 90 MM HG: CPT | Performed by: FAMILY MEDICINE

## 2024-11-14 PROCEDURE — 3077F SYST BP >= 140 MM HG: CPT | Performed by: FAMILY MEDICINE

## 2024-11-14 PROCEDURE — 3008F BODY MASS INDEX DOCD: CPT | Performed by: FAMILY MEDICINE

## 2024-11-14 PROCEDURE — 99214 OFFICE O/P EST MOD 30 MIN: CPT | Performed by: FAMILY MEDICINE

## 2024-11-14 RX ORDER — CYCLOBENZAPRINE HCL 10 MG
10 TABLET ORAL 3 TIMES DAILY PRN
Qty: 30 TABLET | Refills: 0 | Status: SHIPPED | OUTPATIENT
Start: 2024-11-14 | End: 2025-01-13

## 2024-11-14 RX ORDER — OMEPRAZOLE 40 MG/1
CAPSULE, DELAYED RELEASE ORAL
Qty: 90 CAPSULE | Refills: 3 | Status: SHIPPED | OUTPATIENT
Start: 2024-11-14 | End: 2024-11-14 | Stop reason: SDUPTHER

## 2024-11-14 RX ORDER — METHYLPREDNISOLONE 4 MG/1
TABLET ORAL
Qty: 21 TABLET | Refills: 0 | Status: SHIPPED | OUTPATIENT
Start: 2024-11-14 | End: 2024-11-21

## 2024-11-14 RX ORDER — OMEPRAZOLE 40 MG/1
CAPSULE, DELAYED RELEASE ORAL
Qty: 90 CAPSULE | Refills: 3 | Status: SHIPPED | OUTPATIENT
Start: 2024-11-14

## 2024-11-14 ASSESSMENT — ENCOUNTER SYMPTOMS
DYSURIA: 0
PARESIS: 0
BACK PAIN: 1
PERIANAL NUMBNESS: 0
FEVER: 0
WEIGHT LOSS: 0
ABDOMINAL PAIN: 0
PARESTHESIAS: 0
BOWEL INCONTINENCE: 0
LEG PAIN: 1
TINGLING: 1
HEADACHES: 0
NUMBNESS: 1
WEAKNESS: 1

## 2024-11-14 ASSESSMENT — PATIENT HEALTH QUESTIONNAIRE - PHQ9
1. LITTLE INTEREST OR PLEASURE IN DOING THINGS: NOT AT ALL
2. FEELING DOWN, DEPRESSED OR HOPELESS: NOT AT ALL
SUM OF ALL RESPONSES TO PHQ9 QUESTIONS 1 AND 2: 0

## 2024-11-14 NOTE — PROGRESS NOTES
Subjective   Patient ID: Easton Ragland is a 69 y.o. male who presents for Back Pain.  Back Pain  This is a new problem. The current episode started in the past 7 days. The problem occurs constantly. The problem has been waxing and waning since onset. The pain is present in the sacro-iliac. The quality of the pain is described as shooting and stabbing. The pain radiates to the right thigh. The pain is at a severity of 10/10. The pain is The same all the time. The symptoms are aggravated by bending, lying down, sitting and standing. Stiffness is present All day. Associated symptoms include leg pain, numbness, tingling and weakness. Pertinent negatives include no abdominal pain, bladder incontinence, bowel incontinence, chest pain, dysuria, fever, headaches, paresis, paresthesias, pelvic pain, perianal numbness or weight loss.   Patient being seen for chief complaint being addressed we also needed to review patient's past medical history due to his complex medical problems we went over his significant other medical issues individually reviewed his medications and did an overview of his past labs.  And medications      Review of Systems   Constitutional:  Negative for fever and weight loss.   Cardiovascular:  Negative for chest pain.   Gastrointestinal:  Negative for abdominal pain and bowel incontinence.   Genitourinary:  Negative for bladder incontinence, dysuria and pelvic pain.   Musculoskeletal:  Positive for back pain.   Neurological:  Positive for tingling, weakness and numbness. Negative for headaches and paresthesias.       Objective   Physical Exam  Constitutional:       Appearance: Normal appearance.   HENT:      Mouth/Throat:      Mouth: Mucous membranes are moist.   Eyes:      Extraocular Movements: Extraocular movements intact.      Pupils: Pupils are equal, round, and reactive to light.   Cardiovascular:      Rate and Rhythm: Normal rate and regular rhythm.   Pulmonary:      Effort: Pulmonary effort is  normal.   Musculoskeletal:         General: Tenderness present.      Comments: Low back pain decr rom    Neurological:      Mental Status: He is alert.         Assessment/Plan   Problem List Items Addressed This Visit    None  Visit Diagnoses         Codes    Acute right-sided low back pain without sciatica    -  Primary M54.50    Relevant Medications    methylPREDNISolone (Medrol Dospak) 4 mg tablets    cyclobenzaprine (Flexeril) 10 mg tablet    Laryngopharyngeal reflux (LPR)     K21.9    Relevant Medications    omeprazole (PriLOSEC) 40 mg DR edna Zhu DO 11/14/24 3:28 PM

## 2024-11-25 ENCOUNTER — HOSPITAL ENCOUNTER (OUTPATIENT)
Dept: RADIOLOGY | Facility: EXTERNAL LOCATION | Age: 69
Discharge: HOME | End: 2024-11-25

## 2024-11-25 ENCOUNTER — APPOINTMENT (OUTPATIENT)
Dept: NEUROSURGERY | Facility: CLINIC | Age: 69
End: 2024-11-25
Payer: MEDICARE

## 2024-11-25 VITALS
HEART RATE: 73 BPM | WEIGHT: 195 LBS | HEIGHT: 70 IN | SYSTOLIC BLOOD PRESSURE: 132 MMHG | DIASTOLIC BLOOD PRESSURE: 80 MMHG | BODY MASS INDEX: 27.92 KG/M2

## 2024-11-25 DIAGNOSIS — M54.16 LUMBAR RADICULOPATHY: Primary | ICD-10-CM

## 2024-11-25 PROCEDURE — 99205 OFFICE O/P NEW HI 60 MIN: CPT | Performed by: STUDENT IN AN ORGANIZED HEALTH CARE EDUCATION/TRAINING PROGRAM

## 2024-11-25 PROCEDURE — 3079F DIAST BP 80-89 MM HG: CPT | Performed by: STUDENT IN AN ORGANIZED HEALTH CARE EDUCATION/TRAINING PROGRAM

## 2024-11-25 PROCEDURE — 1125F AMNT PAIN NOTED PAIN PRSNT: CPT | Performed by: STUDENT IN AN ORGANIZED HEALTH CARE EDUCATION/TRAINING PROGRAM

## 2024-11-25 PROCEDURE — 3075F SYST BP GE 130 - 139MM HG: CPT | Performed by: STUDENT IN AN ORGANIZED HEALTH CARE EDUCATION/TRAINING PROGRAM

## 2024-11-25 PROCEDURE — 3008F BODY MASS INDEX DOCD: CPT | Performed by: STUDENT IN AN ORGANIZED HEALTH CARE EDUCATION/TRAINING PROGRAM

## 2024-11-25 PROCEDURE — 1159F MED LIST DOCD IN RCRD: CPT | Performed by: STUDENT IN AN ORGANIZED HEALTH CARE EDUCATION/TRAINING PROGRAM

## 2024-11-25 PROCEDURE — 1157F ADVNC CARE PLAN IN RCRD: CPT | Performed by: STUDENT IN AN ORGANIZED HEALTH CARE EDUCATION/TRAINING PROGRAM

## 2024-11-25 RX ORDER — GABAPENTIN 100 MG/1
100 CAPSULE ORAL 3 TIMES DAILY
Qty: 90 CAPSULE | Refills: 0 | Status: SHIPPED | OUTPATIENT
Start: 2024-11-25 | End: 2024-12-25

## 2024-11-25 ASSESSMENT — PATIENT HEALTH QUESTIONNAIRE - PHQ9
SUM OF ALL RESPONSES TO PHQ9 QUESTIONS 1 AND 2: 2
10. IF YOU CHECKED OFF ANY PROBLEMS, HOW DIFFICULT HAVE THESE PROBLEMS MADE IT FOR YOU TO DO YOUR WORK, TAKE CARE OF THINGS AT HOME, OR GET ALONG WITH OTHER PEOPLE: SOMEWHAT DIFFICULT
1. LITTLE INTEREST OR PLEASURE IN DOING THINGS: SEVERAL DAYS
2. FEELING DOWN, DEPRESSED OR HOPELESS: SEVERAL DAYS

## 2024-11-25 ASSESSMENT — PAIN SCALES - GENERAL: PAINLEVEL_OUTOF10: 10-WORST PAIN EVER

## 2024-11-25 NOTE — PROGRESS NOTES
Easton Ragland is a 69 y.o. year old male p/w increasing low back pain with radiation down to his right leg     14/14 systems reviewed and negative other than what is listed in the history of present illness        Past Medical History:   Diagnosis Date    Benign essential hypertension 08/31/2023    CAD (coronary artery disease) 08/31/2023    Elevated  Agatston units    Cardiomyopathy, dilated (Multi) 08/31/2023    Mildly reduced on stress 49% ... NL on resting echo    DVT (deep venous thrombosis) (Multi) 08/31/2023 2014 ... appears to be unprovoked    History of stroke with residual effects 05/28/2015 2015    Hypercholesterolemia 08/31/2023    Dr. Zhu follows    Personal history of other diseases of the circulatory system     History of coronary atherosclerosis    Personal history of other diseases of the musculoskeletal system and connective tissue     History of gout    Stenosis of carotid artery 08/31/2023    mild B/L       Past Surgical History:   Procedure Laterality Date    OTHER SURGICAL HISTORY  12/06/2019    Tonsillectomy with adenoidectomy    OTHER SURGICAL HISTORY  12/06/2019    Knee replacement           Current Outpatient Medications:     allopurinol (Zyloprim) 100 mg tablet, TAKE 1 TABLET DAILY, Disp: 90 tablet, Rfl: 3    ascorbic acid, vitamin C, 500 mg capsule, Take by mouth., Disp: , Rfl:     aspirin 81 mg EC tablet, Take 1 tablet (81 mg) by mouth once daily., Disp: , Rfl:     atorvastatin (Lipitor) 40 mg tablet, TAKE 1 TABLET AT BEDTIME, Disp: 90 tablet, Rfl: 3    co-enzyme Q-10 50 mg capsule, Take 2 capsules (100 mg) by mouth once daily., Disp: , Rfl:     docosahexaenoic acid/epa (FISH OIL ORAL), Fish Oil OIL  Refills: 0     Active, Disp: , Rfl:     folic acid (Folvite) 1 mg tablet, Take 1 tablet (1 mg) by mouth once daily., Disp: 90 tablet, Rfl: 3    metoprolol tartrate (Lopressor) 25 mg tablet, Take 1 tablet by mouth 2 times a day., Disp: 180 tablet, Rfl: 3    multivitamin  (MULTIPLE VITAMINS ORAL), Take by mouth early in the morning.., Disp: , Rfl:     omeprazole (PriLOSEC) 40 mg DR capsule, One po bid, Disp: 90 capsule, Rfl: 3    thiamine 100 mg tablet, Take 1 tablet (100 mg) by mouth once daily., Disp: 90 tablet, Rfl: 3    valsartan-hydrochlorothiazide (Diovan-HCT) 80-12.5 mg tablet, TAKE 1 TABLET DAILY, Disp: 90 tablet, Rfl: 3    cyclobenzaprine (Flexeril) 10 mg tablet, Take 1 tablet (10 mg) by mouth 3 times a day as needed for muscle spasms., Disp: 30 tablet, Rfl: 0    gabapentin (Neurontin) 100 mg capsule, Take 1 capsule (100 mg) by mouth 3 times a day., Disp: 90 capsule, Rfl: 0    Current Facility-Administered Medications:     Tc-99m tetrofosmin (Myoview) injection 23.3 millicurie, 23.3 millicurie, intravenous, Once in imaging, Liat Mccartney MD    Tc-99m tetrofosmin (Myoview) injection 8.3 millicurie, 8.3 millicurie, intravenous, Once in imaging, Liat Mccartney MD      Vitals:    11/25/24 1412   BP: 132/80   Pulse: 73     Objective   General: Well developed, awake/alert/oriented x3, no distress, alert and cooperative  Skin: Warm and dry, no lesions, no rashes  ENMT: Mucous membranes moist, no apparent injury, no lesions seen  Head/Neck: Neck Supple, no apparent injury  Respiratory/Thorax: Normal breath sounds with good chest expansion, thorax symmetric  Cardiovascular: No pitting edema, no JVD    Motor Strength: 5/5 Throughout all extremities    Muscle Bulk: Normal and symmetric in all extremities    Posture:   -- Cervical: Normal  -- Thoracic: Normal  -- Lumbar : Normal  Paraspinal muscle spasm/tenderness absent.     Sensation: intact to light touch     Relevant Results    needs flex-ex needs CT L-spine MRI L-spine with right L4-5 disc herniation into the lateral recess        Problem List Items Addressed This Visit    None  Visit Diagnoses       Lumbar radiculopathy    -  Primary    Relevant Medications    gabapentin (Neurontin) 100 mg capsule    Other Relevant Orders    CT lumbar  spine wo IV contrast    XR lumbar spine 4+ views w flexion extension    Referral to Physical Therapy    Referral to Pain Medicine               Assessment/Plan   Easton Ragland is a 69 year old male who presents today with shooting lower back pain that radiates down to his right leg along with numbness and tingling sensation. His symptoms started on the 11/8, while taking groceries out of the car. He was treated with a Medrol Dosepak, muscle relaxants and chiropractic adjustments with no significant improvement. Denies any bowel and bladder incontinence.    We reviewed his MRI showing L4-5 right sided lateral recess stenosis.     I recommend trial of Gabapentin to help calm his nerves down. I will also refer him for pain management and PT. If symptoms do not improve or worsen, will then need to proceed with surgery. Briefly discussed surgery, risks and benefits associated with it. Red flag symptoms discussed with patients that warrant ED visit.     Will tentatively schedule him for 1/2025, if symptoms improve, he will let me know and will cancel this for him.       Anabel Miller MD    of Neurosurgery   [facility] [place]   [facility]   Office: [phone]  Fax: [phone]       Scribe Attestation  By signing my name below, I, Criss Robins, attest that this documentation has been prepared under the direction and in the presence of Anabel Miller MD.

## 2024-11-25 NOTE — H&P (VIEW-ONLY)
Easton Ragland is a 69 y.o. year old male p/w increasing low back pain with radiation down to his right leg     14/14 systems reviewed and negative other than what is listed in the history of present illness        Past Medical History:   Diagnosis Date    Benign essential hypertension 08/31/2023    CAD (coronary artery disease) 08/31/2023    Elevated  Agatston units    Cardiomyopathy, dilated (Multi) 08/31/2023    Mildly reduced on stress 49% ... NL on resting echo    DVT (deep venous thrombosis) (Multi) 08/31/2023 2014 ... appears to be unprovoked    History of stroke with residual effects 05/28/2015 2015    Hypercholesterolemia 08/31/2023    Dr. Zhu follows    Personal history of other diseases of the circulatory system     History of coronary atherosclerosis    Personal history of other diseases of the musculoskeletal system and connective tissue     History of gout    Stenosis of carotid artery 08/31/2023    mild B/L       Past Surgical History:   Procedure Laterality Date    OTHER SURGICAL HISTORY  12/06/2019    Tonsillectomy with adenoidectomy    OTHER SURGICAL HISTORY  12/06/2019    Knee replacement           Current Outpatient Medications:     allopurinol (Zyloprim) 100 mg tablet, TAKE 1 TABLET DAILY, Disp: 90 tablet, Rfl: 3    ascorbic acid, vitamin C, 500 mg capsule, Take by mouth., Disp: , Rfl:     aspirin 81 mg EC tablet, Take 1 tablet (81 mg) by mouth once daily., Disp: , Rfl:     atorvastatin (Lipitor) 40 mg tablet, TAKE 1 TABLET AT BEDTIME, Disp: 90 tablet, Rfl: 3    co-enzyme Q-10 50 mg capsule, Take 2 capsules (100 mg) by mouth once daily., Disp: , Rfl:     docosahexaenoic acid/epa (FISH OIL ORAL), Fish Oil OIL  Refills: 0     Active, Disp: , Rfl:     folic acid (Folvite) 1 mg tablet, Take 1 tablet (1 mg) by mouth once daily., Disp: 90 tablet, Rfl: 3    metoprolol tartrate (Lopressor) 25 mg tablet, Take 1 tablet by mouth 2 times a day., Disp: 180 tablet, Rfl: 3    multivitamin  (MULTIPLE VITAMINS ORAL), Take by mouth early in the morning.., Disp: , Rfl:     omeprazole (PriLOSEC) 40 mg DR capsule, One po bid, Disp: 90 capsule, Rfl: 3    thiamine 100 mg tablet, Take 1 tablet (100 mg) by mouth once daily., Disp: 90 tablet, Rfl: 3    valsartan-hydrochlorothiazide (Diovan-HCT) 80-12.5 mg tablet, TAKE 1 TABLET DAILY, Disp: 90 tablet, Rfl: 3    cyclobenzaprine (Flexeril) 10 mg tablet, Take 1 tablet (10 mg) by mouth 3 times a day as needed for muscle spasms., Disp: 30 tablet, Rfl: 0    gabapentin (Neurontin) 100 mg capsule, Take 1 capsule (100 mg) by mouth 3 times a day., Disp: 90 capsule, Rfl: 0    Current Facility-Administered Medications:     Tc-99m tetrofosmin (Myoview) injection 23.3 millicurie, 23.3 millicurie, intravenous, Once in imaging, Liat Mccartney MD    Tc-99m tetrofosmin (Myoview) injection 8.3 millicurie, 8.3 millicurie, intravenous, Once in imaging, Liat Mccartney MD      Vitals:    11/25/24 1412   BP: 132/80   Pulse: 73     Objective   General: Well developed, awake/alert/oriented x3, no distress, alert and cooperative  Skin: Warm and dry, no lesions, no rashes  ENMT: Mucous membranes moist, no apparent injury, no lesions seen  Head/Neck: Neck Supple, no apparent injury  Respiratory/Thorax: Normal breath sounds with good chest expansion, thorax symmetric  Cardiovascular: No pitting edema, no JVD    Motor Strength: 5/5 Throughout all extremities    Muscle Bulk: Normal and symmetric in all extremities    Posture:   -- Cervical: Normal  -- Thoracic: Normal  -- Lumbar : Normal  Paraspinal muscle spasm/tenderness absent.     Sensation: intact to light touch     Relevant Results    needs flex-ex needs CT L-spine MRI L-spine with right L4-5 disc herniation into the lateral recess        Problem List Items Addressed This Visit    None  Visit Diagnoses       Lumbar radiculopathy    -  Primary    Relevant Medications    gabapentin (Neurontin) 100 mg capsule    Other Relevant Orders    CT lumbar  spine wo IV contrast    XR lumbar spine 4+ views w flexion extension    Referral to Physical Therapy    Referral to Pain Medicine               Assessment/Plan   Easton Ragland is a 69 year old male who presents today with shooting lower back pain that radiates down to his right leg along with numbness and tingling sensation. His symptoms started on the 11/8, while taking groceries out of the car. He was treated with a Medrol Dosepak, muscle relaxants and chiropractic adjustments with no significant improvement. Denies any bowel and bladder incontinence.    We reviewed his MRI showing L4-5 right sided lateral recess stenosis.     I recommend trial of Gabapentin to help calm his nerves down. I will also refer him for pain management and PT. If symptoms do not improve or worsen, will then need to proceed with surgery. Briefly discussed surgery, risks and benefits associated with it. Red flag symptoms discussed with patients that warrant ED visit.     Will tentatively schedule him for 1/2025, if symptoms improve, he will let me know and will cancel this for him.       Anabel Miller MD    of Neurosurgery   [facility] [place]   [facility]   Office: [phone]  Fax: [phone]       Scribe Attestation  By signing my name below, I, Criss Robins, attest that this documentation has been prepared under the direction and in the presence of Anabel Miller MD.

## 2024-12-02 ENCOUNTER — TELEPHONE (OUTPATIENT)
Dept: PRIMARY CARE | Facility: CLINIC | Age: 69
End: 2024-12-02
Payer: MEDICARE

## 2024-12-02 NOTE — TELEPHONE ENCOUNTER
Cyclobenzaprine was approved for this patient. Copied message from [employer]    CaseId: [id number];Status: Approved; Review Type: Prior Auth; Coverage Start Date: 11/02/2024; Coverage End Date: 12/02/2025

## 2024-12-02 NOTE — PROGRESS NOTES
History of Present Complaint:  The patient was referred to us by Referring Provider: Anabel Miller MD for lower back pain and right leg radiculopathy. this is 69 y.o.  male accompanied with his wife Emily with a past history of obesity BMI 28, CAD, cardiomyopathy, PVD, DVT, history of CVA with residual effects presenting with severe intractable lower back pain with severe right leg radiculopathy pain that started 2 months ago while getting groceries from his car felt back pain and the pain got really bad and started shooting down his leg and could not move.  End up going to PCP who gave him pain medication and and steroid tapered in addition to cyclobenzaprine.  The patient did not get any better his symptoms started getting worse he cannot sleep he cannot put any weight on his right leg.  The patient is really miserable with his pain.  He ended up going to orthopedic surgeon who eventually ordered stat MRI and waivered the physical therapy requirement and showed large L4-5 disc herniation affecting the L4-5 foramina.  Patient has no incontinence no saddle paresthesia no paralysis  The patient has no red flags          Procedures:   None    Portions of record reviewed for pertinent issues: active problem list, medication list, allergies, family history, social history, notes from last encounter, encounters, lab results, imaging and other available records.    I have personally reviewed the OAR[facility] report for this patient. This report is scanned into the electronic medical record. I have considered the risks of abuse, dependence, addiction and diversion. It showed: Oxycodone 5 mg from PCP  OPIOID RISK ASSESSMENT SCORE 0/26  Aberrant behavior: None  My patient has no underlying substance abuse or alcohol abuse and there's no mental health conditions contributing to the patient's pain.        Diagnostic studies:  11/20/2024 MRI of the lumbar spine did not show any bone marrow edema or endplate change there is multiple  degenerative changes with facet hypertrophy but significant disc herniation to the right at the L4-5 affecting the right L5 nerve root:  Findings:     The conus medullaris ends normally. The alignment of the lumbar spine is anatomic.     The vertebral body heights are well maintained. There is no aggressive bone marrow signal abnormality. Heterogeneity of the bone marrow likely secondary to osteopenia/osteoporosis.     Disc desiccation throughout the lumbar spine. Mild intervertebral disc height loss throughout the lumbar spine.     L1-L2: Small disc bulge. No neural foraminal or spinal canal stenosis.     L2-L3: Small disc bulge. No neuroforaminal or spinal canal stenosis.     L3-L4: Small disc bulge. Mild facet arthropathy. Mild bilateral neuroforaminal stenosis. Mild spinal canal stenosis.     L4-L5: Small disc bulge with small superimposed right central disc extrusion measuring approximately 6 mm in AP dimension by 7 mm in transverse dimension coursing inferiorly along the posterior aspect of L5 for a length of approximately 9 mm. This disc extrusion abuts and posteriorly displaces the right L5 nerve within the spinal canal. Moderate facet arthropathy. Ligamentum flavum thickening. Severe spinal canal stenosis. Moderate bilateral neural foraminal stenosis.     L5-S1: Small disc bulge. Moderate left and mild right facet arthropathy. Severe left and moderate right neural foraminal stenosis.     Visualized paravertebral soft tissues appear within normal limits.     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     Social history: , Has 2 children, 4 grandchildren, Finished high school, and Higher education some college denies smoking drinking or use of illicit drugs      Review of Systems   HENT: Negative.     Eyes: Negative.    Respiratory: Negative.     Cardiovascular: Negative.    Gastrointestinal: Negative.     Endocrine: Negative.    Genitourinary: Negative.    Musculoskeletal:  Positive for back pain, gait problem and myalgias.        Patient using the walker to walk with shopping cart sign   Skin: Negative.    Neurological:         Severe pain in the leg with numbness   Hematological: Negative.    Psychiatric/Behavioral: Negative.            Physical Exam  Vitals and nursing note reviewed.   Constitutional:       Appearance: Normal appearance.       HENT:      Head: Normocephalic and atraumatic.      Nose: Nose normal.   Eyes:      Extraocular Movements: Extraocular movements intact.      Conjunctiva/sclera: Conjunctivae normal.      Pupils: Pupils are equal, round, and reactive to light.   Cardiovascular:      Rate and Rhythm: Normal rate and regular rhythm.      Pulses: Normal pulses.      Heart sounds: Normal heart sounds.   Pulmonary:      Effort: Pulmonary effort is normal.      Breath sounds: Normal breath sounds.   Abdominal:      General: Abdomen is flat. Bowel sounds are normal.      Palpations: Abdomen is soft.   Skin:     General: Skin is warm.   Neurological:      General: No focal deficit present.      Mental Status: He is alert and oriented to person, place, and time.      Cranial Nerves: Cranial nerves 2-12 are intact.      Sensory: Sensory deficit present.      Motor: Weakness present.      Coordination: Coordination is intact.      Gait: Tandem walk abnormal.      Deep Tendon Reflexes: Reflexes abnormal.      Reflex Scores:       Patellar reflexes are 4+ on the right side and 4+ on the left side.       Achilles reflexes are 2+ on the right side and 0 on the left side.     Comments: Pain with resistance in the right leg because of pain with positive sit slump at almost 10 degrees  Decreased vibratory sensation in the right toes compared to the left  Loss of left ankle reflex and decreased right ankle reflex while hyperreflexia in the knees   Psychiatric:         Mood and Affect: Mood normal.          Behavior: Behavior normal.            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  The patient is really unable to move and the whole time is just laying in the bed.  I asked him to start taking his oxycodone with acetaminophen and ibuprofen 600 mg together and increase his gabapentin to the 300 mg and I gave him titration schedule.  Cyclobenzaprine did not touch him I gave him Soma tabs to see if that will help with his pain.           Plan  At least 50% of the visit was involved in the discussion of the options for treatment. We discussed exercises, medication, interventional therapies and surgery. Healthy life style is essential with patient hard work to achieve the wellness. In addition; discussion with the patient and/or family about any of the diagnostic results, impressions and/or recommended diagnostic studies, prognosis, risks and benefits of treatment options, instructions for treatment and/or follow-up, importance of compliance with chosen treatment options, risk-factor reduction, and patient/family education.           Recommended Pool therapy, walking in the pool, at least 3x per week for 30 minutes  Recommend self-directed physical therapy with at least daily exercises for minimum of 20-minute, brochure was handed to the patient  Patient to follow-up with physical therapy  Oxycodone 5 mg with acetaminophen at 1000 mg and ibuprofen 600 mg 3 times daily as needed  Soma tabs 350 mg 3 times daily as needed  Gabapentin 300 mg titration schedule was handed to the patient  Ibuprofen 600 mg ordered  Right L4-5 TFESI after  lorazepam 2 mg  Healthy lifestyle and anti-inflammatory diet in addition to weight control discussed with the patient  Alternative chronic pain therapies was discussed, encouraged and information was handed  Return to Clinic 3 months       *Please note this report has been produced using speech recognition software and may contain errors related to that system including grammar, punctuation and spelling as well as words and phrases that may be inappropriate. If there are questions or concerns, please feel free to contact me to clarify.    Luda Drew MD      detailed exam no joint swelling negative

## 2024-12-04 ENCOUNTER — OFFICE VISIT (OUTPATIENT)
Dept: PAIN MEDICINE | Facility: CLINIC | Age: 69
End: 2024-12-04
Payer: MEDICARE

## 2024-12-04 VITALS
TEMPERATURE: 98.3 F | OXYGEN SATURATION: 97 % | HEIGHT: 70 IN | DIASTOLIC BLOOD PRESSURE: 81 MMHG | HEART RATE: 82 BPM | RESPIRATION RATE: 16 BRPM | WEIGHT: 195 LBS | SYSTOLIC BLOOD PRESSURE: 128 MMHG | BODY MASS INDEX: 27.92 KG/M2

## 2024-12-04 DIAGNOSIS — M54.16 LUMBAR RADICULOPATHY: ICD-10-CM

## 2024-12-04 DIAGNOSIS — M47.817 LUMBOSACRAL SPONDYLOSIS WITHOUT MYELOPATHY: Primary | ICD-10-CM

## 2024-12-04 PROCEDURE — 3008F BODY MASS INDEX DOCD: CPT | Performed by: ANESTHESIOLOGY

## 2024-12-04 PROCEDURE — 1159F MED LIST DOCD IN RCRD: CPT | Performed by: ANESTHESIOLOGY

## 2024-12-04 PROCEDURE — 3074F SYST BP LT 130 MM HG: CPT | Performed by: ANESTHESIOLOGY

## 2024-12-04 PROCEDURE — 99214 OFFICE O/P EST MOD 30 MIN: CPT | Performed by: ANESTHESIOLOGY

## 2024-12-04 PROCEDURE — 99204 OFFICE O/P NEW MOD 45 MIN: CPT | Performed by: ANESTHESIOLOGY

## 2024-12-04 PROCEDURE — 1157F ADVNC CARE PLAN IN RCRD: CPT | Performed by: ANESTHESIOLOGY

## 2024-12-04 PROCEDURE — 3079F DIAST BP 80-89 MM HG: CPT | Performed by: ANESTHESIOLOGY

## 2024-12-04 RX ORDER — OXYCODONE HYDROCHLORIDE 5 MG/1
5 TABLET ORAL EVERY 8 HOURS PRN
Qty: 21 TABLET | Refills: 0 | Status: SHIPPED | OUTPATIENT
Start: 2024-12-04 | End: 2024-12-11

## 2024-12-04 RX ORDER — OXYCODONE HYDROCHLORIDE 5 MG/1
TABLET ORAL
COMMUNITY
Start: 2024-11-21

## 2024-12-04 RX ORDER — GABAPENTIN 300 MG/1
600 CAPSULE ORAL 3 TIMES DAILY
Qty: 180 CAPSULE | Refills: 2 | Status: SHIPPED | OUTPATIENT
Start: 2024-12-04 | End: 2025-03-04

## 2024-12-04 RX ORDER — LORAZEPAM 2 MG/1
TABLET ORAL
Qty: 1 TABLET | Refills: 0 | Status: SHIPPED | OUTPATIENT
Start: 2024-12-04

## 2024-12-04 RX ORDER — CARISOPRODOL 350 MG/1
350 TABLET ORAL 3 TIMES DAILY PRN
Qty: 21 TABLET | Refills: 0 | Status: SHIPPED | OUTPATIENT
Start: 2024-12-04 | End: 2024-12-11

## 2024-12-04 RX ORDER — IBUPROFEN 600 MG/1
600 TABLET ORAL 3 TIMES DAILY
Qty: 90 TABLET | Refills: 11 | Status: SHIPPED | OUTPATIENT
Start: 2024-12-04 | End: 2025-11-29

## 2024-12-04 SDOH — ECONOMIC STABILITY: FOOD INSECURITY: WITHIN THE PAST 12 MONTHS, THE FOOD YOU BOUGHT JUST DIDN'T LAST AND YOU DIDN'T HAVE MONEY TO GET MORE.: NEVER TRUE

## 2024-12-04 SDOH — ECONOMIC STABILITY: FOOD INSECURITY: WITHIN THE PAST 12 MONTHS, YOU WORRIED THAT YOUR FOOD WOULD RUN OUT BEFORE YOU GOT MONEY TO BUY MORE.: NEVER TRUE

## 2024-12-04 ASSESSMENT — LIFESTYLE VARIABLES
HOW MANY STANDARD DRINKS CONTAINING ALCOHOL DO YOU HAVE ON A TYPICAL DAY: 3 OR 4
HOW OFTEN DURING THE LAST YEAR HAVE YOU FAILED TO DO WHAT WAS NORMALLY EXPECTED FROM YOU BECAUSE OF DRINKING: NEVER
HOW OFTEN DURING THE LAST YEAR HAVE YOU NEEDED AN ALCOHOLIC DRINK FIRST THING IN THE MORNING TO GET YOURSELF GOING AFTER A NIGHT OF HEAVY DRINKING: NEVER
AUDIT TOTAL SCORE: 5
HAS A RELATIVE, FRIEND, DOCTOR, OR ANOTHER HEALTH PROFESSIONAL EXPRESSED CONCERN ABOUT YOUR DRINKING OR SUGGESTED YOU CUT DOWN: NO
HOW OFTEN DURING THE LAST YEAR HAVE YOU HAD A FEELING OF GUILT OR REMORSE AFTER DRINKING: NEVER
HOW OFTEN DURING THE LAST YEAR HAVE YOU FOUND THAT YOU WERE NOT ABLE TO STOP DRINKING ONCE YOU HAD STARTED: NEVER
AUDIT-C TOTAL SCORE: 5
HAVE YOU OR SOMEONE ELSE BEEN INJURED AS A RESULT OF YOUR DRINKING: NO
HOW OFTEN DO YOU HAVE A DRINK CONTAINING ALCOHOL: 4 OR MORE TIMES A WEEK
TOTAL SCORE: 0
HOW OFTEN DO YOU HAVE SIX OR MORE DRINKS ON ONE OCCASION: NEVER
SKIP TO QUESTIONS 9-10: 0
HOW OFTEN DURING THE LAST YEAR HAVE YOU BEEN UNABLE TO REMEMBER WHAT HAPPENED THE NIGHT BEFORE BECAUSE YOU HAD BEEN DRINKING: NEVER

## 2024-12-04 ASSESSMENT — ENCOUNTER SYMPTOMS
MYALGIAS: 1
GASTROINTESTINAL NEGATIVE: 1
OCCASIONAL FEELINGS OF UNSTEADINESS: 1
LOSS OF SENSATION IN FEET: 1
BACK PAIN: 1
DEPRESSION: 0
ENDOCRINE NEGATIVE: 1
CARDIOVASCULAR NEGATIVE: 1
RESPIRATORY NEGATIVE: 1
HEMATOLOGIC/LYMPHATIC NEGATIVE: 1
EYES NEGATIVE: 1
PSYCHIATRIC NEGATIVE: 1

## 2024-12-04 ASSESSMENT — PAIN SCALES - GENERAL
PAINLEVEL_OUTOF10: 10-WORST PAIN EVER
PAINLEVEL_OUTOF10: 10 - WORST POSSIBLE PAIN

## 2024-12-04 ASSESSMENT — PAIN DESCRIPTION - DESCRIPTORS: DESCRIPTORS: SHOOTING

## 2024-12-04 ASSESSMENT — PATIENT HEALTH QUESTIONNAIRE - PHQ9
2. FEELING DOWN, DEPRESSED OR HOPELESS: NOT AT ALL
SUM OF ALL RESPONSES TO PHQ9 QUESTIONS 1 AND 2: 0
1. LITTLE INTEREST OR PLEASURE IN DOING THINGS: NOT AT ALL

## 2024-12-04 ASSESSMENT — COLUMBIA-SUICIDE SEVERITY RATING SCALE - C-SSRS
2. HAVE YOU ACTUALLY HAD ANY THOUGHTS OF KILLING YOURSELF?: NO
6. HAVE YOU EVER DONE ANYTHING, STARTED TO DO ANYTHING, OR PREPARED TO DO ANYTHING TO END YOUR LIFE?: NO
1. IN THE PAST MONTH, HAVE YOU WISHED YOU WERE DEAD OR WISHED YOU COULD GO TO SLEEP AND NOT WAKE UP?: NO

## 2024-12-04 ASSESSMENT — PAIN - FUNCTIONAL ASSESSMENT: PAIN_FUNCTIONAL_ASSESSMENT: 0-10

## 2024-12-04 NOTE — PATIENT INSTRUCTIONS
Please take oxycodone 5 mg with acetaminophen 1000 mg and ibuprofen 600 mg 3 times daily as needed    Gabapentin titration     Please take Gabapentin 300 mg capsules as follows:              AM         PM    Bedtime       Day 1 0 0 1   Day 2 0 0 1   Day 3 0 1 1   Day 4 0 1 1   Day 5 1 1 1   Day 6 1 1 1   Day 7 1 1 1   Day 8 1 1 2   Day 9 1 1 2   Day 10 1 2 2   Day 11 1 2 2   Day 12 2 2 2   Day 13 2 2 2   Day 14 2 2 3   Day 15 2 2 3   Day 16 2 3 3   Day 17 2 3 3   Day 18 3 3 3   Day 19 3 3 3   Day 20 3 3 4   Day 21 3 3 4   Day 22 3 4 4   Day 23 3 4 4   Day 24 4 4 4     Do not exceed 3600mg per day.   Stop and maintain dose when symptoms resolve.  Reduce as instructed if side effects not tolerated.

## 2024-12-04 NOTE — PROGRESS NOTES
No chief complaint on file.    History of Present Complaint:  The patient was referred to us by Referring Provider: : Anabel Miller MD . this is 69 y.o.  male  with a past history of  obesity BMI 28, CAD, cardiomyopathy, PVD, DVT, history of CVA with residual effects  presenting with lower back pain and right leg radiculopathy     Pain started 11/8/2024  Pain is better with nothing .  Pain is worst , walking , standing setting .      The pain is described as  shooting pain .      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    Social history: , Has 2children, 4grandchildren and 0great-grandchildren, Finished high school, and Higher education some college     Diagnostic studies: MRI studies, x-rays    Opioid Risk Assessment Score 0/26    Cristian Each Box that Applies Female Male   FAMILY HISTORY OF SUBSTANCE ABUSE  Cristian the boxes that applies   Alcohol ?  1    ? 3   Illegal drugs ?  2 ? 3   Rx drugs ?  4 ? 4   PERSONAL HISTORY OF SUBSTNACE ABUSE   Alcohol ?  3 ?  3   Illegal drugs ?  4 ?  4    Rx drugs ?  5 ?  5   Age Between 16-45 years ?  1 ?  1   History of Preadolescent Sexual Abuse ?  3 ?  0   PSYCHOLOGIC DISEASE   ADD, OCD, bipolar, schizophrenia   ?  2 ?  2   Depression ?  1 ?  1   Scoring Totals  0     Scoring (Risk)  0-3 - Low  4-7 - Moderate  8 - High  Opioid Risk Assessment Score 0/26

## 2024-12-04 NOTE — LETTER
December 4, 2024     Anabel Miller MD  [address] [place] [facility], [address]  [facility] [address]    Patient: Easton Ragland   YOB: 1955   Date of Visit: 12/4/2024       Dear Dr. Anabel Miller MD:    Thank you for referring Easton Ragland to me for evaluation. Below are my notes for this consultation.  If you have questions, please do not hesitate to call me. I look forward to following your patient along with you.       Sincerely,     Luda Drew MD      CC: No Recipients  ______________________________________________________________________________________    History of Present Complaint:  The patient was referred to us by Referring Provider: Anabel Miller MD for lower back pain and right leg radiculopathy. this is 69 y.o.  male accompanied with his wife Emily with a past history of obesity BMI 28, CAD, cardiomyopathy, PVD, DVT, history of CVA with residual effects presenting with severe intractable lower back pain with severe right leg radiculopathy pain that started 2 months ago while getting groceries from his car felt back pain and the pain got really bad and started shooting down his leg and could not move.  End up going to PCP who gave him pain medication and and steroid tapered in addition to cyclobenzaprine.  The patient did not get any better his symptoms started getting worse he cannot sleep he cannot put any weight on his right leg.  The patient is really miserable with his pain.  He ended up going to orthopedic surgeon who eventually ordered stat MRI and waivered the physical therapy requirement and showed large L4-5 disc herniation affecting the L4-5 foramina.  Patient has no incontinence no saddle paresthesia no paralysis  The patient has no red flags          Procedures:   None    Portions of record reviewed for pertinent issues: active problem list, medication list, allergies, family history, social history, notes from last encounter, encounters, lab  results, imaging and other available records.    I have personally reviewed the OARRS report for this patient. This report is scanned into the electronic medical record. I have considered the risks of abuse, dependence, addiction and diversion. It showed: Oxycodone 5 mg from PCP  OPIOID RISK ASSESSMENT SCORE 0/26  Aberrant behavior: None  My patient has no underlying substance abuse or alcohol abuse and there's no mental health conditions contributing to the patient's pain.        Diagnostic studies:  11/20/2024 MRI of the lumbar spine did not show any bone marrow edema or endplate change there is multiple degenerative changes with facet hypertrophy but significant disc herniation to the right at the L4-5 affecting the right L5 nerve root:  Findings:     The conus medullaris ends normally. The alignment of the lumbar spine is anatomic.     The vertebral body heights are well maintained. There is no aggressive bone marrow signal abnormality. Heterogeneity of the bone marrow likely secondary to osteopenia/osteoporosis.     Disc desiccation throughout the lumbar spine. Mild intervertebral disc height loss throughout the lumbar spine.     L1-L2: Small disc bulge. No neural foraminal or spinal canal stenosis.     L2-L3: Small disc bulge. No neuroforaminal or spinal canal stenosis.     L3-L4: Small disc bulge. Mild facet arthropathy. Mild bilateral neuroforaminal stenosis. Mild spinal canal stenosis.     L4-L5: Small disc bulge with small superimposed right central disc extrusion measuring approximately 6 mm in AP dimension by 7 mm in transverse dimension coursing inferiorly along the posterior aspect of L5 for a length of approximately 9 mm. This disc extrusion abuts and posteriorly displaces the right L5 nerve within the spinal canal. Moderate facet arthropathy. Ligamentum flavum thickening. Severe spinal canal stenosis. Moderate bilateral neural foraminal stenosis.     L5-S1: Small disc bulge. Moderate left and mild  right facet arthropathy. Severe left and moderate right neural foraminal stenosis.     Visualized paravertebral soft tissues appear within normal limits.     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     Social history: , Has 2 children, 4 grandchildren, Finished high school, and Higher education some college denies smoking drinking or use of illicit drugs      Review of Systems   HENT: Negative.     Eyes: Negative.    Respiratory: Negative.     Cardiovascular: Negative.    Gastrointestinal: Negative.    Endocrine: Negative.    Genitourinary: Negative.    Musculoskeletal:  Positive for back pain, gait problem and myalgias.        Patient using the walker to walk with shopping cart sign   Skin: Negative.    Neurological:         Severe pain in the leg with numbness   Hematological: Negative.    Psychiatric/Behavioral: Negative.            Physical Exam  Vitals and nursing note reviewed.   Constitutional:       Appearance: Normal appearance.       HENT:      Head: Normocephalic and atraumatic.      Nose: Nose normal.   Eyes:      Extraocular Movements: Extraocular movements intact.      Conjunctiva/sclera: Conjunctivae normal.      Pupils: Pupils are equal, round, and reactive to light.   Cardiovascular:      Rate and Rhythm: Normal rate and regular rhythm.      Pulses: Normal pulses.      Heart sounds: Normal heart sounds.   Pulmonary:      Effort: Pulmonary effort is normal.      Breath sounds: Normal breath sounds.   Abdominal:      General: Abdomen is flat. Bowel sounds are normal.      Palpations: Abdomen is soft.   Skin:     General: Skin is warm.   Neurological:      General: No focal deficit present.      Mental Status: He is alert and oriented to person, place, and time.      Cranial Nerves: Cranial nerves 2-12 are intact.      Sensory: Sensory deficit present.      Motor: Weakness present.      Coordination:  Coordination is intact.      Gait: Tandem walk abnormal.      Deep Tendon Reflexes: Reflexes abnormal.      Reflex Scores:       Patellar reflexes are 4+ on the right side and 4+ on the left side.       Achilles reflexes are 2+ on the right side and 0 on the left side.     Comments: Pain with resistance in the right leg because of pain with positive sit slump at almost 10 degrees  Decreased vibratory sensation in the right toes compared to the left  Loss of left ankle reflex and decreased right ankle reflex while hyperreflexia in the knees   Psychiatric:         Mood and Affect: Mood normal.         Behavior: Behavior normal.            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  The patient is really unable to move and the whole time is just laying in the bed.  I asked him to start taking his oxycodone with acetaminophen and ibuprofen 600 mg together and increase his gabapentin to the 300 mg and I gave him titration schedule.  Cyclobenzaprine did not touch him I gave him Soma tabs to see if that will help with his pain.           Plan  At least 50% of the visit was involved in the discussion of the options for treatment. We discussed exercises, medication, interventional therapies and surgery. Healthy life style is essential with patient hard work to achieve the wellness. In addition; discussion with the patient and/or family about any of the diagnostic results, impressions and/or recommended diagnostic studies, prognosis, risks and benefits of treatment options, instructions for treatment and/or follow-up, importance of compliance  with chosen treatment options, risk-factor reduction, and patient/family education.           Recommended Pool therapy, walking in the pool, at least 3x per week for 30 minutes  Recommend self-directed physical therapy with at least daily exercises for minimum of 20-minute, brochure was handed to the patient  Patient to follow-up with physical therapy  Oxycodone 5 mg with acetaminophen at 1000 mg and ibuprofen 600 mg 3 times daily as needed  Soma tabs 350 mg 3 times daily as needed  Gabapentin 300 mg titration schedule was handed to the patient  Ibuprofen 600 mg ordered  Right L4-5 TFESI after lorazepam 2 mg  Healthy lifestyle and anti-inflammatory diet in addition to weight control discussed with the patient  Alternative chronic pain therapies was discussed, encouraged and information was handed  Return to Clinic 3 months       *Please note this report has been produced using speech recognition software and may contain errors related to that system including grammar, punctuation and spelling as well as words and phrases that may be inappropriate. If there are questions or concerns, please feel free to contact me to clarify.    Luda Drew MD

## 2024-12-16 DIAGNOSIS — M47.817 LUMBOSACRAL SPONDYLOSIS WITHOUT MYELOPATHY: ICD-10-CM

## 2024-12-16 DIAGNOSIS — M54.16 LUMBAR RADICULOPATHY: ICD-10-CM

## 2024-12-16 RX ORDER — OXYCODONE HYDROCHLORIDE 5 MG/1
5 TABLET ORAL EVERY 8 HOURS PRN
Qty: 21 TABLET | Refills: 0 | Status: SHIPPED | OUTPATIENT
Start: 2024-12-16 | End: 2024-12-23

## 2024-12-16 NOTE — TELEPHONE ENCOUNTER
Orders Placed This Encounter      oxyCODONE (Roxicodone) 5 mg immediate release tablet       Patient informed

## 2024-12-17 ENCOUNTER — APPOINTMENT (OUTPATIENT)
Dept: RADIOLOGY | Facility: HOSPITAL | Age: 69
End: 2024-12-17
Payer: MEDICARE

## 2024-12-19 ENCOUNTER — EVALUATION (OUTPATIENT)
Dept: PHYSICAL THERAPY | Facility: CLINIC | Age: 69
End: 2024-12-19
Payer: MEDICARE

## 2024-12-19 DIAGNOSIS — M54.16 LUMBAR RADICULOPATHY: Primary | ICD-10-CM

## 2024-12-19 PROCEDURE — 97110 THERAPEUTIC EXERCISES: CPT | Mod: GP

## 2024-12-19 PROCEDURE — 97161 PT EVAL LOW COMPLEX 20 MIN: CPT | Mod: GP

## 2024-12-19 ASSESSMENT — COLUMBIA-SUICIDE SEVERITY RATING SCALE - C-SSRS
1. IN THE PAST MONTH, HAVE YOU WISHED YOU WERE DEAD OR WISHED YOU COULD GO TO SLEEP AND NOT WAKE UP?: NO
2. HAVE YOU ACTUALLY HAD ANY THOUGHTS OF KILLING YOURSELF?: NO
6. HAVE YOU EVER DONE ANYTHING, STARTED TO DO ANYTHING, OR PREPARED TO DO ANYTHING TO END YOUR LIFE?: NO

## 2024-12-19 ASSESSMENT — ENCOUNTER SYMPTOMS
DEPRESSION: 0
OCCASIONAL FEELINGS OF UNSTEADINESS: 0
LOSS OF SENSATION IN FEET: 0

## 2024-12-19 NOTE — PROGRESS NOTES
Patient Name Easton Ragland  MRN: [id number]  Today's Date: 25  Time Calculation  Start Time: 0152  Stop Time: 5  Time Calculation (min): 43 min    Insurance:   Visit #: 1  Insurance Reviewed  (per information provided by  pre-cert team)   Authorization required: N  Approved # of visits: MN    Therapy Diagnoses:   Problem List Items Addressed This Visit             ICD-10-CM    Lumbar radiculopathy - Primary M54.16    Relevant Orders    Follow Up In Physical Therapy     General:  Reason for visit: lumbar radiculopathy   Referred by: Dr Rajendra Banerjee MD appt:  2024  Preferred Name:  Dr Miller  Script:  Pt eval and treat  Onset Date:  2024    Assessment:    Patient with recent onset of pain which XR/MRI revealed multi-level DDD, with grade I spondylolisthesis L4-5 and presence of stenosis. Pt will benefit from aquatics PT in a gravity reduced environment to allow patient to move with more ease and less pain, with hope to transition to land based ex as symptoms decrease    Problems:    Pain:  _x__  Posture/Body mechanics deficits:  _x__  Decreased knowledge HEP:  __x_  Decreased knowledge of Precautions:  _x__  Activity Limitations:   _x__  ADL's/IADL's/Self-care skills:  _x__  ROM/Joint Mobility:  _x__  Strength:  _x__  Decreased functional level:   _x__  Flexibility:  __x_  Tenderness/decreased soft tissue mobility:  _x__  Gait/Stairs:  _x__  Fall Risk:  __x_  Balance:  _x__  Edema:  ___  Participation restrictions:  ___  Sensory:  _x__  Transfers:  ___  Decreased knowledge of brace:   ___  Other:  ___    X Indicates included in problem list    Goals:      STG:  3 weeks  Increased postural awareness    Compliant with HEP.     Decrease pain by 1-2 points on VAS scale taking medication as needed    LTG:  by discharge  Increased postural awareness and posture WFL without worsening radicular symptoms     pain to 0-2 allowing patient to resume majority of functional activities     Improve Oswestry  "to: 10% limitation of function.    Normal gait on level and uneven surfaces community level distances for improved function in the community.    Normal reciprocal pattern on stairs for improved function to all levels of home.      Increase trunk AROM to WFL for improved function with dressing and chores.      Increase trunk strength and stability and R LE strength to WFL for improved function with chores and recreational activities.      Increase B LE flexibility to WFL.      Decrease R LE radicular symptoms 75% per patient report.      I and compliant with HEP and back care.       Rehab potential to achieve the above goals is good.    Patient is aware of diagnosis and prognosis and agrees with established plan of care and goals.    Plan:   Skilled PT 2 x/week for 4-6 weeks (Until goals achieved, maximum rehab potential has been achieved, or patient has plateaued)  for:    Aquatics  _____  CP   __x__  Dry needling  ____  Education  __x__  Electrical Stimulation  __x__  Gait training  ____  HEP  _x___  HP  ____  Manual  __x__  Mechanical Traction  ____  NMR  __x__  Self-care/home management  _x___  Therapeutic Exercise   ___x_  Therapeutic Activities  __x__  US  ____  Work Conditioning  ____  Re-assessment  ____  Other  ____    X Indicates included in treatment plan    PT for aquatics initiating LE flexibility, LE and core strengthening ex    Subjective:    HPI: Pt was loading bags of groceries when he felt the pain in his back on 11/8/2024    Pain:    Pain Constant at various degrees of intensity     Type of pain:  2-3 to 8 right PSIS down posterior thigh to knee, \"sharp\" to the back , dull to thigh, numb lower leg and foot  What increases pain: worst first thing in the AM, walking around, bending  What decreases pain:  changing positions, pillows under legs, taking a consistent regimen of medications for pain    Medical Hx/  Precautions: HTN, Stroke     Adult Screening Risk:  no recent falls in last 6 " months    Fall Risk:  low/mod    Patient goal:  become pain free  Patient is aware of diagnosis and prognosis.    Living Environment:    Social Support:  lives with:  spouse  DME: multi-level house    Prior Function:   Pt functioned without limitations prior to 11/8/2024    Function:    A and O x 3  Sleep: discussed postures patient sleeps in.   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  Sitting: short term  Standing: increased pain   Walking:   increased pain     Objective:        Outcome Measures:        Posture:  Asymmetrical weightbearing, with weight shifted to left, slight left SB of trunk as rib cage is inferior on left. Decreased lordosis    Gait/Stairs: Pt ambulates with antalgic gait using cane, while increased weight shift towards left side    ROM:  Trunk:  Flexion:  3rd to inferior patella  Extension: neutral  RSB:  3rd to mid lateral thigh  LSB:  3rd to mid lateral thigh    MMT:  R  L  Hip  Flex:  4+  5  Abd:  4  5  Ext:  4-  4+  B LE myotomes: WFL     Flexibility:  Hamstrings:  SLR:  R: 45 P!   L: 75  Hip flexors: mild tightness    Treatment:    Evaluation:    minutes  Therapeutic Ex:  Instructed in HEP below:  Access Code: [email or web address]  URL: [email or web address]  Date: 12/19/2024  Prepared by: Beth López    Exercises  - Supine Transversus Abdominis Bracing - Hands on Stomach  - 1-2 x daily - 7 x weekly - 1 sets - 10 reps - 5-10 count hold  - Supine March  - 1-2 x daily - 7 x weekly - 1 sets - 5-10 reps - 3-5 count hold  - Supine Hip Adduction Isometric with Ball  - 1-2 x daily - 7 x weekly - 1-2 sets - 10 reps - 5-10 count hold  - Bent Knee Fallouts  - 1-2 x daily - 7 x weekly - 1-2 sets - 10 reps - 10 count hold  - Quadruped Transversus Abdominis Bracing  - 1-2 x daily - 7 x weekly - 1 sets - 10 reps - 5-10 count hold  Education:  poc,  anatomy, physiology, posture, body mechanics, safety awareness, HEP, aquatics prep  Preferred learning:  pictures, demonstration.  Verbalized good understanding.                                    .

## 2024-12-20 ENCOUNTER — APPOINTMENT (OUTPATIENT)
Dept: PRIMARY CARE | Facility: CLINIC | Age: 69
End: 2024-12-20
Payer: MEDICARE

## 2024-12-23 ENCOUNTER — HOSPITAL ENCOUNTER (OUTPATIENT)
Dept: PAIN MEDICINE | Facility: CLINIC | Age: 69
Discharge: HOME | End: 2024-12-23
Payer: MEDICARE

## 2024-12-23 VITALS
TEMPERATURE: 96.8 F | HEART RATE: 78 BPM | OXYGEN SATURATION: 93 % | WEIGHT: 209 LBS | RESPIRATION RATE: 18 BRPM | SYSTOLIC BLOOD PRESSURE: 138 MMHG | BODY MASS INDEX: 29.92 KG/M2 | DIASTOLIC BLOOD PRESSURE: 65 MMHG | HEIGHT: 70 IN

## 2024-12-23 DIAGNOSIS — M47.817 LUMBOSACRAL SPONDYLOSIS WITHOUT MYELOPATHY: ICD-10-CM

## 2024-12-23 DIAGNOSIS — M54.16 LUMBAR RADICULOPATHY: ICD-10-CM

## 2024-12-23 PROCEDURE — 2550000001 HC RX 255 CONTRASTS: Performed by: ANESTHESIOLOGY

## 2024-12-23 PROCEDURE — 64483 NJX AA&/STRD TFRM EPI L/S 1: CPT | Performed by: ANESTHESIOLOGY

## 2024-12-23 PROCEDURE — 2500000004 HC RX 250 GENERAL PHARMACY W/ HCPCS (ALT 636 FOR OP/ED): Performed by: ANESTHESIOLOGY

## 2024-12-23 RX ORDER — LIDOCAINE HYDROCHLORIDE 5 MG/ML
INJECTION, SOLUTION INFILTRATION; INTRAVENOUS AS NEEDED
Status: COMPLETED | OUTPATIENT
Start: 2024-12-23 | End: 2024-12-23

## 2024-12-23 RX ORDER — METHYLPREDNISOLONE ACETATE 80 MG/ML
INJECTION, SUSPENSION INTRA-ARTICULAR; INTRALESIONAL; INTRAMUSCULAR; SOFT TISSUE AS NEEDED
Status: COMPLETED | OUTPATIENT
Start: 2024-12-23 | End: 2024-12-23

## 2024-12-23 RX ORDER — LIDOCAINE HYDROCHLORIDE 10 MG/ML
INJECTION, SOLUTION EPIDURAL; INFILTRATION; INTRACAUDAL; PERINEURAL AS NEEDED
Status: COMPLETED | OUTPATIENT
Start: 2024-12-23 | End: 2024-12-23

## 2024-12-23 RX ORDER — LIDOCAINE HYDROCHLORIDE AND EPINEPHRINE 10; 10 UG/ML; MG/ML
INJECTION, SOLUTION INFILTRATION; PERINEURAL AS NEEDED
Status: COMPLETED | OUTPATIENT
Start: 2024-12-23 | End: 2024-12-23

## 2024-12-23 ASSESSMENT — PAIN - FUNCTIONAL ASSESSMENT
PAIN_FUNCTIONAL_ASSESSMENT: 0-10
PAIN_FUNCTIONAL_ASSESSMENT: 0-10

## 2024-12-23 ASSESSMENT — PAIN DESCRIPTION - DESCRIPTORS: DESCRIPTORS: STABBING

## 2024-12-23 ASSESSMENT — PAIN SCALES - GENERAL
PAINLEVEL_OUTOF10: 5 - MODERATE PAIN
PAINLEVEL_OUTOF10: 2
PAINLEVEL_OUTOF10: 2

## 2024-12-23 ASSESSMENT — ENCOUNTER SYMPTOMS
LOSS OF SENSATION IN FEET: 0
DEPRESSION: 0
OCCASIONAL FEELINGS OF UNSTEADINESS: 1

## 2024-12-26 ENCOUNTER — TREATMENT (OUTPATIENT)
Dept: PHYSICAL THERAPY | Facility: CLINIC | Age: 69
End: 2024-12-26
Payer: MEDICARE

## 2024-12-26 DIAGNOSIS — M54.16 LUMBAR RADICULOPATHY: Primary | ICD-10-CM

## 2024-12-26 PROCEDURE — 97113 AQUATIC THERAPY/EXERCISES: CPT | Mod: GP

## 2024-12-26 NOTE — PROGRESS NOTES
"Physical Therapy  Physical Therapy Progress Note    Patient Name Easton Ragland   MRN: [id number]  Today's Date: 12/26/2024  Time Calculation  Start Time: 1430    Insurance:    Visit #:   2   Insurance Reviewed  (per information provided by  pre-cert team)   Authorization required: N  Approved # of visits: MN  Therapy Diagnoses:   Problem List Items Addressed This Visit             ICD-10-CM    Lumbar radiculopathy - Primary M54.16       General:  Reason for visit: lumbar radiculopathy   Referred by: Dr Rajendra Banerjee MD appt:  12/23/2024  Preferred Name:  Dr Miller  Script:  Pt eval and treat  Onset Date:  11/8/2024    Subjective:   Patient reports:  He is feeling much better, he is on Gabapentin 600 mg 3 times a day.  He has not taken intermittently due to holiday alcohol consumption.  Patient has seen neuro surgeon and ortho last week.  He may not need surgery because he is feeling better.  Patient received a cortisone shot on last Monday.    you fallen since last visit:  No    Medical Hx/  Precautions: HTN, Stroke  Adult Screening Risk:  no recent falls in last 6 months     Fall Risk:  low/mod     Patient goal:  become pain free  Patient is aware of diagnosis and prognosis.    Pain:  1/10  Location/Type of pain:  Low back    HEP compliance/understanding:  Non able to exercise due to holidays.    Objective:   Objective Measurements:  Outcome Measures:  Other Measures  Dizziness Handicap Inventory: 64     Function:   Improved function, able to ambulate around the house without difficulty  Posture: Forward head, rounded shoulder and slightly forward lean  Gait:  Balance:   ROM:    Strength:  Flexibility:      Treatment:   Therapeutic Exercise: Aquatics      minutes  Treatment:   **= HEP progression today NV= Next visit  np= not performed  nb= non-billable  G= group HEP= discharged to HEP  Aquatics:                                             44 minutes  \"Patient is a sinker\"  TM= Treadmill, T= Treadmill speed, " "C=Current, BTW = Back to Wall, NV = Next Visit, NP = Not Performed, G = Group, NB = non-billable      Fwd TM, T=3, C=10  3 min    Bwd TM, T=2, C=10  2 min  Lateral ambulation with wall support 4   HR/TR with hand rail support 2  x 10  Marching with TM rail support 2 x 10  2 way hip AROM  ( abd/flex) with TM rails support  2 x 10 each  Partial Squats with TM rail support support  2 x 10     BTW abdominal draw in 5”/10   BTW DLS BUE open paddles flex/ext, ABD/ADD 2 x 10  BTW DLS open paddles horizontal ADD/ABD 2 x 10   BTW SKTC 10\" hold x 5 albaro  BTW Piriformis stretch 20\" x 2 albaro NV  BTW DLS open Aquacisor push/pull, up/down x 10 each   BTW Dynamic Hamstring Stretch x 10 albaro holding TM rail but with back against the wall  NP     Deep End with noodle:  Bicycle fwd 2 minutes    Hip abduction and adduction 1 min  Vertical hang x 4 minutes     Stair Stretches: BLE hamstrings/hip flexors/calves        Manual Therapy:       minutes      Neuromuscular Re-education:      minutes      Gait Training:            minutes      Aquatics:            minutes      Therapeutic Activity:      minutes      Modalities:       Vasopneumatic Device       minutes  Electrical Stimulation          minutes  Ultrasound            minutes  Iontophoresis                     minutes  Cold Pack            minutes  Mechanical Traction           minutes    Self Care Home Management:    minutes    Canalith Reposition:          minutes     Education:         minutes    Other:                minutes      Evaluation Complexity: Low:   minutes; Moderate   minutes; Complex   minutes    Re-Evaluation:   minutes      Education:  V/c for correct technique and posture with exercises. Proper loading.     Assessment:  Patient tolerated treatment well, did well with Aquatics this date.  Complained of slight tightness with TM walk which was abolished with marching.  Patient has a tendency to over do his exercises, advised patient to monitor his symptoms before doing " too much.      Patient needs continued work on/skilled PT for: Lumbar radiculopathy  to address remaining functional, objective and subjective deficits to allow them to return to prior /optimal level of function with ADLs.  Patient is progressing with goals: Yes  Skilled care:  Therapeutic exercises with proper form and technique    Plan:    Continue to progress per poc:   NV Increase repetitions and duration with exercises  HEP:    - Supine Transversus Abdominis Bracing - Hands on Stomach  - 1-2 x daily - 7 x weekly - 1 sets - 10 reps - 5-10 count hold  - Supine March  - 1-2 x daily - 7 x weekly - 1 sets - 5-10 reps - 3-5 count hold  - Supine Hip Adduction Isometric with Ball  - 1-2 x daily - 7 x weekly - 1-2 sets - 10 reps - 5-10 count hold  - Bent Knee Fallouts  - 1-2 x daily - 7 x weekly - 1-2 sets - 10 reps - 10 count hold  - Quadruped Transversus Abdominis Bracing  - 1-2 x daily - 7 x weekly - 1 sets - 10 reps - 5-10 count hold

## 2024-12-30 ENCOUNTER — TREATMENT (OUTPATIENT)
Dept: PHYSICAL THERAPY | Facility: CLINIC | Age: 69
End: 2024-12-30
Payer: MEDICARE

## 2024-12-30 ENCOUNTER — HOSPITAL ENCOUNTER (OUTPATIENT)
Dept: RADIOLOGY | Facility: HOSPITAL | Age: 69
Discharge: HOME | End: 2024-12-30
Payer: MEDICARE

## 2024-12-30 DIAGNOSIS — M54.16 LUMBAR RADICULOPATHY: Primary | ICD-10-CM

## 2024-12-30 DIAGNOSIS — M54.16 LUMBAR RADICULOPATHY: ICD-10-CM

## 2024-12-30 PROCEDURE — 72120 X-RAY BEND ONLY L-S SPINE: CPT

## 2024-12-30 PROCEDURE — 97113 AQUATIC THERAPY/EXERCISES: CPT | Mod: GP

## 2024-12-30 PROCEDURE — 72110 X-RAY EXAM L-2 SPINE 4/>VWS: CPT | Performed by: RADIOLOGY

## 2024-12-30 NOTE — PROGRESS NOTES
"Physical Therapy  Physical Therapy Progress Note    Patient Name Easton Ragland   MRN: [id number]  Today's Date: 12/30/2024  Time Calculation  Start Time: 1435  Stop Time: 1515  Time Calculation (min): 40 min    Insurance:    Visit #:   3   Insurance Reviewed  (per information provided by  pre-cert team)   Authorization required: N  Approved # of visits: MN  Therapy Diagnoses:   Problem List Items Addressed This Visit    None        General:  Reason for visit: lumbar radiculopathy   Referred by: Dr Rajendra Banerjee MD appt:  12/23/2024  Preferred Name:  Dr Miller  Script:  Pt eval and treat  Onset Date:  11/8/2024    Subjective:   Patient reports:  Patient reports last aquatic session was good except muscle soreness.  Patient started driving, getting in and out of his [employer] civic sport is difficult, this causes discomfort.  Patient has seen neuro surgeon and ortho last week.  He may not need surgery because he is feeling better.  Patient received a cortisone shot on last Monday.    you fallen since last visit:  No    Medical Hx/  Precautions: HTN, Stroke  Adult Screening Risk:  no recent falls in last 6 months     Fall Risk:  low/mod     Patient goal:  become pain free  Patient is aware of diagnosis and prognosis.    Pain:  1/10  Location/Type of pain:  Low back    HEP compliance/understanding:  Non able to exercise due to building his desk.  Objective:   Objective Measurements:  Outcome Measures:  Other Measures  Dizziness Handicap Inventory: 64     Function:   Improved function, able to drive, stand and wash dishes.  Posture: Forward head, rounded shoulder and slightly forward lean  Gait:  Balance:   ROM:    Strength:  Flexibility:      Treatment:   Therapeutic Exercise: Aquatics      minutes  Treatment:   **= HEP progression today NV= Next visit  np= not performed  nb= non-billable  G= group HEP= discharged to HEP  Aquatics:                                             44 minutes  \"Patient is a sinker\"  TM= Treadmill, " "T= Treadmill speed, C=Current, BTW = Back to Wall, NV = Next Visit, NP = Not Performed, G = Group, NB = non-billable      Fwd TM, T=3, C=10  3 min    Bwd TM, T=2, C=10  2 min  Lateral ambulation with wall support 4   HR/TR with hand rail support 2  x 10  Marching with TM rail support 2 x 10  3 way hip AROM  ( abd/flex/ext) with TM rails support  2 x 10 each  Partial Squats with TM rail support support  2 x 10     BTW abdominal draw in 5”/10   BTW DLS BUE open paddles flex/ext, ABD/ADD 2 x 10  BTW DLS open paddles horizontal ADD/ABD 2 x 10   BTW SKTC 10\" hold x 5 albaro  BTW Piriformis stretch 20\" x 2 albaro NV  BTW DLS open Aquacisor push/pull, up/down x 10 each   BTW Dynamic Hamstring Stretch x 10 albaro holding TM rail but with back against the wall  NP     Deep End with noodle:  Bicycle fwd 2 minutes    Hip abduction and adduction 2 min  Skiing movement 2 min  Vertical hang x 4 minutes     Stair Stretches: BLE hamstrings/hip flexors/calves        Manual Therapy:       minutes      Neuromuscular Re-education:      minutes      Gait Training:            minutes      Aquatics:          40 minutes      Therapeutic Activity:      minutes      Modalities:       Vasopneumatic Device       minutes  Electrical Stimulation          minutes  Ultrasound            minutes  Iontophoresis                     minutes  Cold Pack            minutes  Mechanical Traction           minutes    Self Care Home Management:    minutes    Canalith Reposition:          minutes     Education:         minutes    Other:                minutes      Evaluation Complexity: Low:   minutes; Moderate   minutes; Complex   minutes    Re-Evaluation:   minutes      Education:  V/c for correct technique and posture with exercises. Proper loading.     Assessment:  Patient tolerated treatment well, did well with Aquatics this date.  Unable to complete all the exercises due to lack of time.  No complaints of pain post session.  Patient has a tendency to over do his " exercises, advised patient to monitor his symptoms before doing too much.    Patient needs continued work on/skilled PT for: Lumbar radiculopathy  to address remaining functional, objective and subjective deficits to allow them to return to prior /optimal level of function with ADLs.  Patient is progressing with goals: Yes  Skilled care:  Therapeutic exercises with proper form and technique    Plan:    Continue to progress per poc:   NV Increase repetitions and duration with exercises  HEP:    - Supine Transversus Abdominis Bracing - Hands on Stomach  - 1-2 x daily - 7 x weekly - 1 sets - 10 reps - 5-10 count hold  - Supine March  - 1-2 x daily - 7 x weekly - 1 sets - 5-10 reps - 3-5 count hold  - Supine Hip Adduction Isometric with Ball  - 1-2 x daily - 7 x weekly - 1-2 sets - 10 reps - 5-10 count hold  - Bent Knee Fallouts  - 1-2 x daily - 7 x weekly - 1-2 sets - 10 reps - 10 count hold  - Quadruped Transversus Abdominis Bracing  - 1-2 x daily - 7 x weekly - 1 sets - 10 reps - 5-10 count hold

## 2025-01-02 ENCOUNTER — TELEPHONE (OUTPATIENT)
Dept: NEUROSURGERY | Facility: CLINIC | Age: 70
End: 2025-01-02

## 2025-01-02 ENCOUNTER — TREATMENT (OUTPATIENT)
Dept: PHYSICAL THERAPY | Facility: CLINIC | Age: 70
End: 2025-01-02
Payer: MEDICARE

## 2025-01-02 DIAGNOSIS — M54.16 LUMBAR RADICULOPATHY: Primary | ICD-10-CM

## 2025-01-02 PROCEDURE — 97113 AQUATIC THERAPY/EXERCISES: CPT | Mod: GP

## 2025-01-02 NOTE — PROGRESS NOTES
"Physical Therapy  Physical Therapy Progress Note    Patient Name Easton Ragland   MRN: [id number]  Today's Date: 1/2/2025  Time Calculation  Start Time: 1441  Stop Time: 1530  Time Calculation (min): 49 min    Insurance:    Visit #:   4   Insurance Reviewed  (per information provided by  pre-cert team)   Authorization required: N  Approved # of visits: MN  Therapy Diagnoses:   Problem List Items Addressed This Visit    None        General:  Reason for visit: lumbar radiculopathy   Referred by: Dr Rajendra Banerjee MD appt:  12/23/2024  Preferred Name:  Dr Miller  Script:  Pt eval and treat  Onset Date:  11/8/2024    Subjective:   Patient reports:  Patient reports last aquatic session was good except muscle soreness.  Patient started driving, getting in and out of his [employer] civic sport is difficult, this causes discomfort.  Patient has seen neuro surgeon and ortho last week.  He may not need surgery because he is feeling better.  Patient received a cortisone shot on last Monday.    you fallen since last visit:  No    Medical Hx/  Precautions: HTN, Stroke  Adult Screening Risk:  no recent falls in last 6 months     Fall Risk:  low/mod     Patient goal:  become pain free  Patient is aware of diagnosis and prognosis.    Pain:  1/10  Location/Type of pain:  Low back    HEP compliance/understanding:  Non able to exercise due to building his desk.  Objective:   Objective Measurements:  Outcome Measures:  Other Measures  Dizziness Handicap Inventory: 64     Function:   Improved function, able to drive, stand and wash dishes.  Posture: Forward head, rounded shoulder and slightly forward lean  Gait:  Balance:   ROM:    Strength:  Flexibility:      Treatment:   Therapeutic Exercise: Aquatics      minutes  Treatment:   **= HEP progression today NV= Next visit  np= not performed  nb= non-billable  G= group HEP= discharged to HEP  Aquatics:                                             44 minutes  \"Patient is a sinker\"  TM= Treadmill, T= " "Treadmill speed, C=Current, BTW = Back to Wall, NV = Next Visit, NP = Not Performed, G = Group, NB = non-billable   Hamstring, gastroc and hip flexor stretch 30 sec hold x 2 each.  Fwd TM, T=6, C=10  3 min    Bwd TM, T=6, C=10  2 min  Lateral ambulation with Barbell 4   HR/TR with hand rail support 2  x 10  Marching with barbell without support 2 x 10  3 way hip AROM  ( abd/flex/ext) with TM rails support  2 x 10 each  Partial Squats without UE support  2 x 10     BTW abdominal draw in 5”/10   BTW DLS BUE open paddles flex/ext, ABD/ADD 2 x 10  BTW DLS open paddles horizontal ADD/ABD 2 x 10     BTW Piriformis stretch 20\" x 2 albaro NV  BTW DLS open Aquacisor push/pull, up/down x 10 each   BTW Dynamic Hamstring Stretch x 10 albaro holding TM rail but with back against the wall  NP     Deep End with noodle:  Bicycle fwd 3 minutes    Hip abduction and adduction 3 min  Skiing movement 2 min  Vertical hang x 4 minutes  BTW SKTC 10\" hold x 5 albaro        Manual Therapy:       minutes      Neuromuscular Re-education:      minutes      Gait Training:            minutes      Aquatics:          49 minutes      Therapeutic Activity:      minutes      Modalities:       Vasopneumatic Device       minutes  Electrical Stimulation          minutes  Ultrasound            minutes  Iontophoresis                     minutes  Cold Pack            minutes  Mechanical Traction           minutes    Self Care Home Management:    minutes    Canalith Reposition:          minutes     Education:         minutes    Other:                minutes      Evaluation Complexity: Low:   minutes; Moderate   minutes; Complex   minutes    Re-Evaluation:   minutes      Education:  V/c for correct technique and posture with exercises. Proper loading.     Assessment:  Patient tolerated treatment well, did well with Aquatics this date.  Patient able to exercise away from the wall with foam barbell.  No complaints of pain post session.  Patient has a tendency to over do " his exercises, advised patient to monitor his symptoms before doing too much.    Patient needs continued work on/skilled PT for: Lumbar radiculopathy  to address remaining functional, objective and subjective deficits to allow them to return to prior /optimal level of function with ADLs.  Patient is progressing with goals: Yes  Skilled care:  Therapeutic exercises with proper form and technique    Plan:    Continue to progress per poc:   NV  BTW Dynamic Hamstring Stretch x 10 albaro holding TM rail but with back against the wall  HEP:    - Supine Transversus Abdominis Bracing - Hands on Stomach  - 1-2 x daily - 7 x weekly - 1 sets - 10 reps - 5-10 count hold  - Supine March  - 1-2 x daily - 7 x weekly - 1 sets - 5-10 reps - 3-5 count hold  - Supine Hip Adduction Isometric with Ball  - 1-2 x daily - 7 x weekly - 1-2 sets - 10 reps - 5-10 count hold  - Bent Knee Fallouts  - 1-2 x daily - 7 x weekly - 1-2 sets - 10 reps - 10 count hold  - Quadruped Transversus Abdominis Bracing  - 1-2 x daily - 7 x weekly - 1 sets - 10 reps - 5-10 count hold

## 2025-01-06 ENCOUNTER — TREATMENT (OUTPATIENT)
Dept: PHYSICAL THERAPY | Facility: CLINIC | Age: 70
End: 2025-01-06
Payer: MEDICARE

## 2025-01-06 DIAGNOSIS — M54.16 LUMBAR RADICULOPATHY: ICD-10-CM

## 2025-01-06 PROCEDURE — 97113 AQUATIC THERAPY/EXERCISES: CPT | Mod: GP,CQ

## 2025-01-06 NOTE — PROGRESS NOTES
"Physical Therapy  Physical Therapy Progress Note    Patient Name Easton Ragland   MRN: [id number]  Today's Date: 1/6/2025  Time Calculation  Start Time: 0840  Stop Time: 0925  Time Calculation (min): 45 min    Insurance:    Visit #:   5   Insurance Reviewed  (per information provided by  pre-cert team)   Authorization required: N  Approved # of visits: MN  Therapy Diagnoses:   Problem List Items Addressed This Visit             ICD-10-CM    Lumbar radiculopathy M54.16         General:  Reason for visit: lumbar radiculopathy   Referred by: Dr Rajendra Banerjee MD appt:  12/23/2024  Preferred Name:  Dr Miller  Script:  Pt eval and treat  Onset Date:  11/8/2024    Subjective:   Patient reports:  Patient reports he feels the pool may be helping, has decreased his use of pain pills, no longer needs them. Unsure about the status of his surgery due to feeling better and being able to amb well. /still reaches down extremely slow to pick things up.    you fallen since last visit:  No    Medical Hx/  Precautions: HTN, Stroke  Adult Screening Risk:  no recent falls in last 6 months     Fall Risk:  low/mod     Patient goal:  become pain free  Patient is aware of diagnosis and prognosis.    Pain:  1/10  Location/Type of pain:  Low back    HEP compliance/understanding: yes  Objective:     Function:   Improved function, able to drive, stand and wash dishes/ performs all ADL's   Posture: Forward head, rounded shoulder and slightly forward lean  Gait: I ambulation with no device  Balance:   ROM:    Strength:  Flexibility:      Treatment:   Therapeutic Exercise: Aquatics      minutes  Treatment:   **= HEP progression today NV= Next visit  np= not performed  nb= non-billable  G= group HEP= discharged to [facility]  Aquatics:                                             44 minutes  \"Patient is a sinker\"  TM= Treadmill, T= Treadmill speed, C=Current, BTW = Back to Wall, NV = Next Visit, NP = Not Performed, G = Group, NB = non-billable   Hamstring, " "gastroc and hip flexor stretch 30 sec hold x 2 each.  Fwd TM, T=6, C=10  3 min    Bwd TM, T=6, C=10  2 min  Lateral ambulation with Barbell 4   HR/TR with hand rail support 2  x 10  C=10  Marching with barbell without support 2 x 10 C=10  3 way hip AROM  ( abd/flex/ext) with TM rails support  2 x 10 each  C=10  Partial Squats without UE support  2 x 10 C=10     BTW abdominal draw in 5”/10 C=10  BTW DLS BUE open paddles flex/ext, ABD/ADD 2 x 10 C=10  BTW DLS open paddles horizontal ADD/ABD 2 x 10  C=10    BTW Piriformis stretch 20\" x 2 albaro C=10  BTW DLS open Aquacisor push/pull, up/down x 10 each C=10  BTW Dynamic Hamstring Stretch x 10 albaro holding TM rail but with back against the wall  NP     Deep End with noodle:  Bicycle fwd 3 minutes    Hip abduction and adduction 3 min  Skiing movement 2 min  Vertical hang x 3 minutes  BTW SKTC 10\" hold x 5 albaro        Manual Therapy:       minutes      Neuromuscular Re-education:      minutes      Gait Training:            minutes      Aquatics:          45 minutes      Therapeutic Activity:      minutes      Modalities:       Vasopneumatic Device       minutes  Electrical Stimulation          minutes  Ultrasound            minutes  Iontophoresis                     minutes  Cold Pack            minutes  Mechanical Traction           minutes    Self Care Home Management:    minutes    Canalith Reposition:          minutes     Education:         minutes    Other:                minutes      Evaluation Complexity: Low:   minutes; Moderate   minutes; Complex   minutes    Re-Evaluation:   minutes      Education:  reviewed HEP/ resumed this weekend    Assessment:  Patient tolerated treatment well, did well with Aquatics this date.  Able to perform all exercises with current and add piriformis stretch.Patient able to exercise away from the wall with foam barbell.  No complaints of pain post session.      Patient needs continued work on/skilled PT for: Lumbar radiculopathy  to address " remaining functional, objective and subjective deficits to allow them to return to prior /optimal level of function with ADLs.  Patient is progressing with goals: Yes  Skilled care:  Therapeutic exercises with proper form and technique    Plan:    Continue to progress per poc  Progress open water exercises without use of Ue's.    HEP:    - Supine Transversus Abdominis Bracing - Hands on Stomach  - 1-2 x daily - 7 x weekly - 1 sets - 10 reps - 5-10 count hold  - Supine March  - 1-2 x daily - 7 x weekly - 1 sets - 5-10 reps - 3-5 count hold  - Supine Hip Adduction Isometric with Ball  - 1-2 x daily - 7 x weekly - 1-2 sets - 10 reps - 5-10 count hold  - Bent Knee Fallouts  - 1-2 x daily - 7 x weekly - 1-2 sets - 10 reps - 10 count hold  - Quadruped Transversus Abdominis Bracing  - 1-2 x daily - 7 x weekly - 1 sets - 10 reps - 5-10 count hold

## 2025-01-09 ENCOUNTER — TREATMENT (OUTPATIENT)
Dept: PHYSICAL THERAPY | Facility: CLINIC | Age: 70
End: 2025-01-09
Payer: MEDICARE

## 2025-01-09 DIAGNOSIS — M54.16 LUMBAR RADICULOPATHY: ICD-10-CM

## 2025-01-09 NOTE — PROGRESS NOTES
"Physical Therapy  Physical Therapy Progress Note    Patient Name Easton Ragland   MRN: [id number]  Today's Date: 1/9/2025  Time Calculation  Start Time: 0445  Stop Time: 0529  Time Calculation (min): 44 min    Insurance:    Visit #:   6   Insurance Reviewed  (per information provided by  pre-cert team)   Authorization required: N  Approved # of visits: MN    Therapy Diagnoses:   Problem List Items Addressed This Visit             ICD-10-CM    Lumbar radiculopathy M54.16         General:  Reason for visit: lumbar radiculopathy   Referred by: Dr Rajendra Banerjee MD appt:  12/23/2024  Preferred Name:  Dr Miller  Script:  Pt eval and treat  Onset Date:  11/8/2024    Subjective:   Patient reports he has no pain. Feels good about his healing.     you fallen since last visit:  No    Medical Hx/  Precautions: HTN, Stroke  Adult Screening Risk:  no recent falls in last 6 months     Fall Risk:  low/mod     Patient goal:  become pain free  Patient is aware of diagnosis and prognosis.    Pain:  0/10  Location/Type of pain:  N/A    HEP compliance/understanding: yes, going well    Objective:   Function:  Uses his recumbant bike daily for 15 minutes   Posture: Forward head, rounded shoulder and slightly forward lean  Gait: Ambulates on tal deck with no assistive device with step through gait with [place]   Stairs: Descends/Ascends pool stairs: with reciprocal gait using bilateral hand rails with [place]   Balance: Performed dynamic balance progression of  Chi w/ LOB x 1 utilizing steppage for recovery with [place], with lack of ankle and hip strategy       Treatment:   **= HEP progression today NV= Next visit  np= not performed  nb= non-billable  G= group HEP= discharged to [facility]  Aquatics:                                             44 minutes  \"Patient is a sinker\"  **Does not plan to go to a community pool**  TM= Treadmill, T= Treadmill speed, C=Current, BTW = Back to Wall, NV = Next Visit, NP = Not Performed, G = " "Group, NB = non-billable   Hamstring, gastroc and hip flexor stretch 30 sec hold x 2 each.  Fwd TM, T=7 C=10  3 min    Bwd TM, T=6, C=10  2 min  C=10 Lateral ambulation at wall  4 laps  4 way HR/TR with noodle  support x 10 each against C  C=10  4 way marching w/ noodle support x 10 each against C C=10  3 way hip AROM  ( abd/flex/ext) with TM rails support  2 x 10 each against C   C=10  Partial Squats without UE support  2 x 10 C=10 NV    Chi #1-#4 3 diaphragmatic breaths each w/ LOB x 1 and steppage for recovery w/ I  BTW abdominal draw in 5”/10 C=10  BTW DLS BUE open paddles flex/ext, ABD/ADD  symmetrical/reciprocal x 10  each C=10  BTW DLS open paddles horizontal ADD/ABD 2 x 10  C=10 NV  BTW Piriformis stretch 20\" x 2 albaro C=10  BTW DLS open Aquacisor push/pull, up/down x 10 each C=10  BTW Dynamic Hamstring Stretch NV     Deep End with noodle:  Bicycle fwd 3 minutes    Hip abduction and adduction 2 minutes   Skiing movement 2 minutes  Vertical hang x 3 minutes  SKTC 10\" hold x 5 albaro       Education:  aquatic exercise technique, TA activation, postural awareness, utilization of balance strategies     Assessment:  Patient tolerated treatment well, did well with Aquatics progression of added multi-directional DLS strengthening against current and added reciprocal drag resistive strengthening with focus on TA activation and avoidance of compensation, with moderate fatigue and no pain.    Patient needs continued work on/skilled PT for: Lumbar radiculopathy  to address remaining functional, objective and subjective deficits to allow them to return to prior /optimal level of function with ADLs.  Patient is progressing with goals: Yes, improved trunk stabilization, TA activation, static and dynamic balance, postural awareness  Skilled care: Aquatics, patient education     Plan:    Continue to progress per poc  NV move away form wall with open board and paddle DLS strengthening. Progress  Chi and add yoga NV per " tolerance.     HEP:    - Supine Transversus Abdominis Bracing - Hands on Stomach  - 1-2 x daily - 7 x weekly - 1 sets - 10 reps - 5-10 count hold  - Supine March  - 1-2 x daily - 7 x weekly - 1 sets - 5-10 reps - 3-5 count hold  - Supine Hip Adduction Isometric with Ball  - 1-2 x daily - 7 x weekly - 1-2 sets - 10 reps - 5-10 count hold  - Bent Knee Fallouts  - 1-2 x daily - 7 x weekly - 1-2 sets - 10 reps - 10 count hold  - Quadruped Transversus Abdominis Bracing  - 1-2 x daily - 7 x weekly - 1 sets - 10 reps - 5-10 count hold

## 2025-01-14 ENCOUNTER — TREATMENT (OUTPATIENT)
Dept: PHYSICAL THERAPY | Facility: CLINIC | Age: 70
End: 2025-01-14
Payer: MEDICARE

## 2025-01-14 DIAGNOSIS — M54.16 LUMBAR RADICULOPATHY: ICD-10-CM

## 2025-01-14 PROCEDURE — 97110 THERAPEUTIC EXERCISES: CPT | Mod: GP

## 2025-01-14 NOTE — PROGRESS NOTES
Physical Therapy  Physical Therapy Reassessment Note    Patient Name Easton Ragland   MRN: [id number]  Today's Date: 1/14/2025  Time Calculation  Start Time: 0320  Stop Time: 0400  Time Calculation (min): 40 min    Insurance:    Visit #:   7   Insurance Reviewed  (per information provided by  pre-cert team)   Authorization required: N  Approved # of visits: MN    Therapy Diagnoses:   Problem List Items Addressed This Visit             ICD-10-CM    Lumbar radiculopathy M54.16     General:  Reason for visit: lumbar radiculopathy   Referred by: Dr Rajendra Banerjee MD appt:  12/23/2024  Preferred Name:  Dr Miller  Script:  Pt eval and treat  Onset Date:  11/8/2024    Subjective:   Patient reports:  His back is doing better and he wishes to make this day his last visit    Have you fallen since last visit:  no    Precautions:    Pain:  0/10  Location/Type of pain:  n/a    HEP compliance/understanding:  good    Objective:   Function:    A and O x 3  Sleep: uninterrupted   ADL's: able to do now   Chores: Can do most chores, but keeps in mind to be careful when he bends and lifts  Driving: Pt can drive  Work: Retired  Recreation: Pt was able to finish her 2 granddaughter's [email or web address] sets for [employer]  Sitting: unlimited  Standing: improved but not where he wants it to be  Walking:  not limited    Objective:        Outcome Measures:  Other Measures; SMITA 2% at final visit  Dizziness Handicap Inventory: 64     Posture:  Initially, asymmetrical weightbearing, with weight shifted to left, slight left SB of trunk as rib cage is inferior on left. Decreased lordosis. Currently, symmetrical weightbearing, neutral spine    Gait/Stairs: Initially, pt ambulated with antalgic gait using cane, while increased weight shift towards left side. Currently he demonstrates a normal gait without assistive device    ROM:  Trunk:  Flexion:  3rd to inferior patella initially and now to mid shin, albaro  Extension: neutral initially and now to 20 deg, approx    RSB:  3rd to mid lateral thigh initially and now to lateral knee  LSB:  3rd to mid lateral thigh initially and now to lateral knee    MMT:  R  L  Hip  Flex:  4+ to 5  5  Abd:  4 to 4+  5  Ext:  4- to 4+ 4+  B LE myotomes: WFL     Flexibility:  Hamstrings:  SLR:  R: 45 L:  60  Hip flexors: mild tightness    Treatment:   **= HEP progression today NV= Next visit  np= not performed  nb= non-billable  G= group HEP= discharged to [facility]  Therapeutic Exercise:     40 minutes  Subjective report taken  Objective report taken and compared to initial evaluation  Goal achievement assessed  Discussed patient's HEP from initial visit, but also ex that he has learned from previous bout of PT. Pt is riding recumbent bike daily  Questions answered to patient's satisfaction    Education:  Cautioned against heavy lifting.     Assessment:  Patient Pt has made gains in trunk mobility, and LE strength. He could benefit from additional therapy for progressive core ex on land but patient is satisfied with continuing on his own.   Patient is progressing with goals: yes  Skilled care:  reassessment, discharge preparation    STG:  3 weeks  Increased postural awareness Goal Met    Compliant with HEP. Goal Met    Decrease pain by 1-2 points on VAS scale taking medication as needed Goal Met    LTG:  by discharge  Increased postural awareness and posture WFL without worsening radicular symptoms Goal Met     pain to 0-2 allowing patient to resume majority of functional activities Goal Met    Improve Oswestry to: 10% limitation of function. Goal Met    Normal gait on level and uneven surfaces community level distances for improved function in the community. Goal Met    Normal reciprocal pattern on stairs for improved function to all levels of home.  Goal Met    Increase trunk AROM to WFL for improved function with dressing and chores.  Goal Met    Increase trunk strength and stability and R LE strength to WFL for improved function with  chores and recreational activities.  Goal Partially Met    Increase B LE flexibility to WFL.  Not significant goal achievement    Decrease R LE radicular symptoms 75% per patient report.  Goal Met    I and compliant with HEP and back care.   Goal Met    Plan:    Discharge to [facility]    HEP:    Access Code: [email or web address]  URL: [email or web address]  Date: 12/19/2024  Prepared by: Beth López    Exercises  - Supine Transversus Abdominis Bracing - Hands on Stomach  - 1-2 x daily - 7 x weekly - 1 sets - 10 reps - 5-10 count hold  - Supine March  - 1-2 x daily - 7 x weekly - 1 sets - 5-10 reps - 3-5 count hold  - Supine Hip Adduction Isometric with Ball  - 1-2 x daily - 7 x weekly - 1-2 sets - 10 reps - 5-10 count hold  - Bent Knee Fallouts  - 1-2 x daily - 7 x weekly - 1-2 sets - 10 reps - 10 count hold  - Quadruped Transversus Abdominis Bracing  - 1-2 x daily - 7 x weekly - 1 sets - 10 reps - 5-10 count hold

## 2025-01-16 ENCOUNTER — APPOINTMENT (OUTPATIENT)
Dept: PHYSICAL THERAPY | Facility: CLINIC | Age: 70
End: 2025-01-16
Payer: MEDICARE

## 2025-01-21 ENCOUNTER — APPOINTMENT (OUTPATIENT)
Dept: PHYSICAL THERAPY | Facility: CLINIC | Age: 70
End: 2025-01-21
Payer: MEDICARE

## 2025-01-23 ENCOUNTER — APPOINTMENT (OUTPATIENT)
Dept: PHYSICAL THERAPY | Facility: CLINIC | Age: 70
End: 2025-01-23
Payer: MEDICARE

## 2025-01-28 ENCOUNTER — APPOINTMENT (OUTPATIENT)
Dept: PHYSICAL THERAPY | Facility: CLINIC | Age: 70
End: 2025-01-28
Payer: MEDICARE

## 2025-02-11 ENCOUNTER — APPOINTMENT (OUTPATIENT)
Dept: PAIN MEDICINE | Facility: CLINIC | Age: 70
End: 2025-02-11
Payer: MEDICARE

## 2025-02-11 VITALS
SYSTOLIC BLOOD PRESSURE: 182 MMHG | RESPIRATION RATE: 10 BRPM | TEMPERATURE: 96.6 F | BODY MASS INDEX: 30.64 KG/M2 | DIASTOLIC BLOOD PRESSURE: 84 MMHG | OXYGEN SATURATION: 95 % | HEIGHT: 70 IN | HEART RATE: 69 BPM | WEIGHT: 214 LBS

## 2025-02-11 DIAGNOSIS — M54.16 LUMBAR RADICULOPATHY: Primary | ICD-10-CM

## 2025-02-11 PROCEDURE — 1157F ADVNC CARE PLAN IN RCRD: CPT | Performed by: NURSE PRACTITIONER

## 2025-02-11 PROCEDURE — 99215 OFFICE O/P EST HI 40 MIN: CPT | Performed by: NURSE PRACTITIONER

## 2025-02-11 PROCEDURE — 3079F DIAST BP 80-89 MM HG: CPT | Performed by: NURSE PRACTITIONER

## 2025-02-11 PROCEDURE — 1125F AMNT PAIN NOTED PAIN PRSNT: CPT | Performed by: NURSE PRACTITIONER

## 2025-02-11 PROCEDURE — 1159F MED LIST DOCD IN RCRD: CPT | Performed by: NURSE PRACTITIONER

## 2025-02-11 PROCEDURE — 3008F BODY MASS INDEX DOCD: CPT | Performed by: NURSE PRACTITIONER

## 2025-02-11 PROCEDURE — 3077F SYST BP >= 140 MM HG: CPT | Performed by: NURSE PRACTITIONER

## 2025-02-11 ASSESSMENT — PATIENT HEALTH QUESTIONNAIRE - PHQ9
2. FEELING DOWN, DEPRESSED OR HOPELESS: NOT AT ALL
SUM OF ALL RESPONSES TO PHQ9 QUESTIONS 1 & 2: 0
1. LITTLE INTEREST OR PLEASURE IN DOING THINGS: NOT AT ALL

## 2025-02-11 ASSESSMENT — ENCOUNTER SYMPTOMS
DIARRHEA: 0
FREQUENCY: 0
NAUSEA: 0
DEPRESSION: 0
BACK PAIN: 1
OCCASIONAL FEELINGS OF UNSTEADINESS: 0
SHORTNESS OF BREATH: 0
FATIGUE: 0
MYALGIAS: 1
WHEEZING: 0
LOSS OF SENSATION IN FEET: 0
CHEST TIGHTNESS: 0
PALPITATIONS: 0
CONSTIPATION: 0
CHILLS: 0

## 2025-02-11 ASSESSMENT — PAIN SCALES - GENERAL
PAINLEVEL_OUTOF10: 3
PAINLEVEL_OUTOF10: 3

## 2025-02-11 ASSESSMENT — PAIN - FUNCTIONAL ASSESSMENT: PAIN_FUNCTIONAL_ASSESSMENT: 0-10

## 2025-02-11 ASSESSMENT — PAIN DESCRIPTION - DESCRIPTORS: DESCRIPTORS: ACHING

## 2025-02-11 NOTE — PROGRESS NOTES
Chief Complain  Follow-up for lower back pain radiating to bilateral lower extremities right worse than left.    History Of Present Illness  Easton Ragland is a 69 y.o. male here for lower back pain radiating to bilateral lower extremities right worse than left. The patient rates the pain at 3 on a scale from 0-10.  The patient describes pain as dull, aching, radiating.  The pain is worsened by bending forward, standing, walking, lifting, twisting, and caring for young grandchildren  and is alleviated by medications nonsteroidal anti-inflammatory drugs, opioid analgesics, and prescribed pain medications, position change, cortisone injections, lying down, and physical therapy .  Since the last visit the pain has improved.    The patient denies any fever, chills, weight loss, weakness, bladder/ bowel incontinence, history of cancer, history of IV drug abuse, recent trauma.     Prior office visit:  12/4/2024 Dr. Drew  The patient was referred to us by Referring Provider: Anabel Miller MD for lower back pain and right leg radiculopathy. this is 69 y.o.  male accompanied with his wife Emily with a past history of obesity BMI 28, CAD, cardiomyopathy, PVD, DVT, history of CVA with residual effects presenting with severe intractable lower back pain with severe right leg radiculopathy pain that started 2 months ago while getting groceries from his car felt back pain and the pain got really bad and started shooting down his leg and could not move.  End up going to PCP who gave him pain medication and and steroid tapered in addition to cyclobenzaprine.  The patient did not get any better his symptoms started getting worse he cannot sleep he cannot put any weight on his right leg.  The patient is really miserable with his pain.  He ended up going to orthopedic surgeon who eventually ordered stat MRI and waivered the physical therapy requirement and showed large L4-5 disc herniation affecting the L4-5 foramina.  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  The patient is really unable to move and the whole time is just laying in the bed.  I asked him to start taking his oxycodone with acetaminophen and ibuprofen 600 mg together and increase his gabapentin to the 300 mg and I gave him titration schedule.  Cyclobenzaprine did not touch him I gave him Soma tabs to see if that will help with his pain.    Procedures:  12/23/2024 right L4-L5 lumbar transforaminal epidural steroid injection the patient has had a 70% improvement in pain and function    Portions of record reviewed for pertinent issues: active problem list, medication list, allergies, family history, social history, notes from last encounter, encounters, lab results, imaging and other available records.      I have personally reviewed the OARRS report for this patient. This report is scanned into the electronic medical record. I have considered the risks of abuse, dependence, addiction and diversion. It showed: Gabapentin and few oxycodone and lorazepam from Dr. Drew  OPIOID RISK ASSESSMENT SCORE 0/26  Aberrant behavior: None  Patient is being treated with opioid therapy for pain and is responding appropriately.  There are NO signs of opioid intoxication, abuse, addiction or withdrawal.  Pupils are equal, reactive to light bilateral, appropriate speech and cognition. Patient denies any opioid induced constipation. The OARRS registry followed periodically, urine toxicology completed and appropriate.      Patient is advised and warned  in specific detail about potential benefits of opioids along with risks and side effects including, but not limited to, dependency, addiction, tolerance, hyperalgesia, anxiety, depression, insomnia, endocrine changes, immunologic disturbances, respiratory depression and death.     Caution advised regarding the use of medications prescribed at this office and specific mention made regarding not to drive or operate heavy machinery if feeling side effects from this the medications. Patient  expressed an understanding in regards to these particular concerns.     Discussed non-opioid options including (But no limited), physical wellness, antiinflammatory diet, icing and heating, meditation, relaxation, massage and acupuncture.    We will continue to monitor and adjust medications as needed.        Past Medical History  He has a past medical history of Benign essential hypertension (08/31/2023), CAD (coronary artery disease) (08/31/2023), Cardiomyopathy, dilated (Multi) (08/31/2023), DVT (deep venous thrombosis) (Multi) (08/31/2023), History of stroke with residual effects (05/28/2015), Hypercholesterolemia (08/31/2023), Personal history of other diseases of the circulatory system, Personal history of other diseases of the musculoskeletal system and connective tissue, and Stenosis of carotid artery (08/31/2023).    Surgical History  He has a past surgical history that includes Other surgical history (12/06/2019) and Other surgical history (12/06/2019).     Social History  He reports that he has been smoking cigars. He has never used smokeless tobacco. He reports current alcohol use of about 6.0 standard drinks of alcohol per week. He reports current drug use. Frequency: 2.00 times per week. Drug: Marijuana.    Family History  No family history on file.     Allergies  Penicillins, Clarithromycin, and Bee pollen    Review of Systems  Review of Systems   Constitutional:  Negative for chills  and fatigue.   Respiratory:  Negative for chest tightness, shortness of breath and wheezing.    Cardiovascular:  Negative for chest pain and palpitations.   Gastrointestinal:  Negative for constipation, diarrhea and nausea.   Genitourinary:  Negative for frequency and urgency.   Musculoskeletal:  Positive for back pain and myalgias.        Lower back pain rating to bilateral lower extremities right worse than left.        Physical Exam  Physical Exam  Constitutional:       General: He is not in acute distress.     Appearance: Normal appearance. He is obese.   HENT:      Head: Normocephalic.   Musculoskeletal:      Lumbar back: Tenderness present. Decreased range of motion. Negative right straight leg raise test and negative left straight leg raise test.      Comments: Lumbar decreased range of motion with flexion extension lateral bending, tenderness with palpation of bilateral lumbar paraspinal and gluteal region.  Bilateral slump test negative bilateral vibratory sensation reflex intact.  Denies any bowel or bladder incontinence or saddle paresthesia.   Neurological:      General: No focal deficit present.      Mental Status: He is alert and oriented to person, place, and time.      GCS: GCS eye subscore is 4. GCS verbal subscore is 5. GCS motor subscore is 6.      Cranial Nerves: Cranial nerves 2-12 are intact.      Sensory: Sensation is intact.      Motor: Motor function is intact.      Coordination: Coordination is intact.      Gait: Gait is intact.      Deep Tendon Reflexes:      Reflex Scores:       Patellar reflexes are 3+ on the right side and 3+ on the left side.       Achilles reflexes are 3+ on the right side and 3+ on the left side.  Psychiatric:         Attention and Perception: Attention and perception normal.         Mood and Affect: Mood normal.         Speech: Speech normal.         Behavior: Behavior normal.         Thought Content: Thought content normal.         Cognition and Memory: Cognition  "normal.         Judgment: Judgment normal.           Last Recorded Vitals  Blood pressure (!) 182/84, pulse 69, temperature 35.9 °C (96.6 °F), temperature source Temporal, resp. rate 10, height 1.778 m (5' 10\"), weight 97.1 kg (214 lb), SpO2 95%.    Reviewed Images  11/20/2024 MRI of the lumbar spine did not show any bone marrow edema or endplate change there is multiple degenerative changes with facet hypertrophy but significant disc herniation to the right at the L4-5 affecting the right L5 nerve root:  Findings:     The conus medullaris ends normally. The alignment of the lumbar spine is anatomic.     The vertebral body heights are well maintained. There is no aggressive bone marrow signal abnormality. Heterogeneity of the bone marrow likely secondary to osteopenia/osteoporosis.     Disc desiccation throughout the lumbar spine. Mild intervertebral disc height loss throughout the lumbar spine.     L1-L2: Small disc bulge. No neural foraminal or spinal canal stenosis.     L2-L3: Small disc bulge. No neuroforaminal or spinal canal stenosis.     L3-L4: Small disc bulge. Mild facet arthropathy. Mild bilateral neuroforaminal stenosis. Mild spinal canal stenosis.     L4-L5: Small disc bulge with small superimposed right central disc extrusion measuring approximately 6 mm in AP dimension by 7 mm in transverse dimension coursing inferiorly along the posterior aspect of L5 for a length of approximately 9 mm. This disc extrusion abuts and posteriorly displaces the right L5 nerve within the spinal canal. Moderate facet arthropathy. Ligamentum flavum thickening. Severe spinal canal stenosis. Moderate bilateral neural foraminal stenosis.     L5-S1: Small disc bulge. Moderate left and mild right facet arthropathy. Severe left and moderate right neural foraminal stenosis.     Visualized paravertebral soft tissues appear within normal limits.     Reviewed Labs  Lab Results   Component Value Date    GLUCOSE 118 (H) 07/01/2024    " CALCIUM 9.1 07/01/2024     07/01/2024    K 4.4 07/01/2024    CO2 25 07/01/2024     07/01/2024    BUN 17 07/01/2024    CREATININE 1.09 07/01/2024           Assessment/Plan     Easton Ragland is a 69 y.o. male recalled past medical history of obesity BMI 28, CAD, cardiomyopathy, PVD, DVT, history of CVA with no residual effects, who is here for follow-up of right lumbar L4-L5 transforaminal epidural steroid injection which provided 100% relief for roughly a month and continues to provide 70% relief of symptoms.  Patient is managing his symptoms with gabapentin 600 mg at bedtime.  He reports since the lumbar injection he is not needed to use oxycodone.  Previous lumbar MRI showed large disc herniation at the L4-L5 affecting the right foraminal and also severe L5-S1 foraminal stenosis on the left.  However since the lumbar injection he is doing well overall.  He is currently following a home stretching regiment after seeing [facility] physical therapist.  He does report neuropathic pain in his radiating to left lower extremity however it is dull and aching and not affecting his ADLs at this time.  I recommended to him to take the gabapentin as prescribed to reduce neuropathic symptoms.  He denies any new neurological or constitutional symptoms.  He denies bowel or bladder incontinence or saddle paresthesia.  Will consider fluoroscopy guided L4-L5 and L5-S1 transforaminal HARIS, if radicular symptoms become more consistent or persistent.  Encouraged to continue with home stretching regiment as tolerated.  Patient verbalized understand plan of care all questions and concerns answered.    Plan  At least 50% of the visit was involved in the discussion of the options for treatment. We discussed exercises, medication, interventional therapies and surgery. Healthy life style is essential with patient hard work to achieve the wellness. In addition; discussion with the patient and/or family about any of the  diagnostic results, impressions and/or recommended diagnostic studies, prognosis, risks and benefits of treatment options, instructions for treatment and/or follow-up, importance of compliance with chosen treatment options, risk-factor reduction, and patient/family education.      *Please note this report has been produced using speech recognition software and may contain errors related to that system including grammar, punctuation and spelling as well as words and phrases that may be inappropriate. If there are questions or concerns, please feel free to contact me to clarify.      I spent 45 minutes in the professional and overall care of this patient.       Endy Kumari, APRN-CNP

## 2025-02-17 ENCOUNTER — APPOINTMENT (OUTPATIENT)
Dept: NEUROSURGERY | Facility: CLINIC | Age: 70
End: 2025-02-17
Payer: MEDICARE

## 2025-03-10 PROBLEM — U07.1 DISEASE DUE TO SEVERE ACUTE RESPIRATORY SYNDROME CORONAVIRUS 2 (SARS-COV-2): Status: RESOLVED | Noted: 2024-06-17 | Resolved: 2025-03-10

## 2025-03-29 PROBLEM — E66.811 CLASS 1 OBESITY WITH BODY MASS INDEX (BMI) OF 30.0 TO 30.9 IN ADULT: Status: ACTIVE | Noted: 2025-03-29

## 2025-03-30 DIAGNOSIS — M54.16 LUMBAR RADICULOPATHY: ICD-10-CM

## 2025-03-30 DIAGNOSIS — K21.9 LARYNGOPHARYNGEAL REFLUX (LPR): ICD-10-CM

## 2025-03-30 DIAGNOSIS — M47.817 LUMBOSACRAL SPONDYLOSIS WITHOUT MYELOPATHY: ICD-10-CM

## 2025-03-31 ENCOUNTER — OFFICE VISIT (OUTPATIENT)
Dept: CARDIOLOGY | Facility: CLINIC | Age: 70
End: 2025-03-31
Payer: MEDICARE

## 2025-03-31 VITALS — HEART RATE: 61 BPM | OXYGEN SATURATION: 98 % | SYSTOLIC BLOOD PRESSURE: 128 MMHG | DIASTOLIC BLOOD PRESSURE: 76 MMHG

## 2025-03-31 DIAGNOSIS — R06.02 SOB (SHORTNESS OF BREATH) ON EXERTION: ICD-10-CM

## 2025-03-31 DIAGNOSIS — I42.0 CARDIOMYOPATHY, DILATED (MULTI): Chronic | ICD-10-CM

## 2025-03-31 DIAGNOSIS — I42.9 CARDIOMYOPATHY, UNSPECIFIED TYPE (MULTI): ICD-10-CM

## 2025-03-31 DIAGNOSIS — I25.10 CORONARY ARTERY DISEASE INVOLVING NATIVE CORONARY ARTERY OF NATIVE HEART WITHOUT ANGINA PECTORIS: Chronic | ICD-10-CM

## 2025-03-31 DIAGNOSIS — I73.9 PERIPHERAL VASCULAR DISEASE (CMS-HCC): ICD-10-CM

## 2025-03-31 DIAGNOSIS — E78.00 HYPERCHOLESTEROLEMIA: Chronic | ICD-10-CM

## 2025-03-31 DIAGNOSIS — I10 BENIGN ESSENTIAL HYPERTENSION: Primary | Chronic | ICD-10-CM

## 2025-03-31 LAB
ATRIAL RATE: 65 BPM
P AXIS: 65 DEGREES
P OFFSET: 199 MS
P ONSET: 141 MS
PR INTERVAL: 160 MS
Q ONSET: 221 MS
QRS COUNT: 11 BEATS
QRS DURATION: 90 MS
QT INTERVAL: 402 MS
QTC CALCULATION(BAZETT): 418 MS
QTC FREDERICIA: 412 MS
R AXIS: 28 DEGREES
T AXIS: 18 DEGREES
T OFFSET: 422 MS
VENTRICULAR RATE: 65 BPM

## 2025-03-31 PROCEDURE — 99214 OFFICE O/P EST MOD 30 MIN: CPT | Performed by: INTERNAL MEDICINE

## 2025-03-31 PROCEDURE — 1159F MED LIST DOCD IN RCRD: CPT | Performed by: INTERNAL MEDICINE

## 2025-03-31 PROCEDURE — 1157F ADVNC CARE PLAN IN RCRD: CPT | Performed by: INTERNAL MEDICINE

## 2025-03-31 PROCEDURE — 3078F DIAST BP <80 MM HG: CPT | Performed by: INTERNAL MEDICINE

## 2025-03-31 PROCEDURE — 3074F SYST BP LT 130 MM HG: CPT | Performed by: INTERNAL MEDICINE

## 2025-03-31 PROCEDURE — 1160F RVW MEDS BY RX/DR IN RCRD: CPT | Performed by: INTERNAL MEDICINE

## 2025-03-31 PROCEDURE — 93005 ELECTROCARDIOGRAM TRACING: CPT | Performed by: INTERNAL MEDICINE

## 2025-03-31 RX ORDER — GABAPENTIN 300 MG/1
600 CAPSULE ORAL 3 TIMES DAILY
Qty: 180 CAPSULE | Refills: 11 | Status: SHIPPED | OUTPATIENT
Start: 2025-03-31

## 2025-03-31 RX ORDER — OMEPRAZOLE 40 MG/1
CAPSULE, DELAYED RELEASE ORAL
Qty: 30 CAPSULE | Refills: 2 | Status: SHIPPED | OUTPATIENT
Start: 2025-03-31

## 2025-03-31 RX ORDER — IBUPROFEN 600 MG/1
TABLET ORAL
Qty: 120 TABLET | Refills: 11 | Status: SHIPPED | OUTPATIENT
Start: 2025-03-31

## 2025-03-31 NOTE — PATIENT INSTRUCTIONS
1. CAD. Elevated calcium score 594 Agatston units. No EKG today due to BL pink eye.  Continue with aspirin atorvastatin and lisinopril.  Exercise nuclear stress test 10/9/2023 below average capacity, no symptoms, normal EKG, frequent PVCs, nuclear images normal 60% Limitation of activity was due to knee pain and sciatica pain.     2. Hyperlipidemia. Monitored by Dr. Zhu.  12/19/2023 LDL 87 HDL 72 triglycerides 116. Wt loss and more stringent dietary modification will help. Target LDL <70.      3. Hypertension. BP stable.      4. Cardiomyopathy.  Exercise nuclear stress test 10/9/2023 negative for ischemia frequent PVCs ejection fraction 60%.  Ejection fraction on the echo 10/9/2023 55 to 60%    5.  Syncope.  Neuro autonomic testing negative. No evidence of subclavian artery stenosis. Better with compression stockings.     RTC 1 year. EKG today

## 2025-03-31 NOTE — TELEPHONE ENCOUNTER
Orders Placed This Encounter      gabapentin (Neurontin) 300 mg capsule      ibuprofen (IBU) 600 mg tablet

## 2025-03-31 NOTE — PROGRESS NOTES
Referred by No ref. provider found    HPI Easton is here for a yearly follow up. Having a lot of problems with his back. He was scheduled for back surgery, but felt better after back injections so cancelled the surgery Now better. Was having imbalance which is better with compression stockings. Initially thought to have subclavian stenosis, but this was found not to be the case.     Past Medical History:  Problem List Items Addressed This Visit    None     Past Medical History:   Diagnosis Date    Benign essential hypertension 08/31/2023    CAD (coronary artery disease) 08/31/2023    Elevated  Agatston units    Cardiomyopathy, dilated (Multi) 08/31/2023    Mildly reduced on stress 49% ... NL on resting echo    DVT (deep venous thrombosis) (Multi) 08/31/2023 2014 ... appears to be unprovoked    History of stroke with residual effects 05/28/2015 2015    Hypercholesterolemia 08/31/2023    Dr. Zhu follows    Personal history of other diseases of the circulatory system     History of coronary atherosclerosis    Personal history of other diseases of the musculoskeletal system and connective tissue     History of gout    Stenosis of carotid artery 08/31/2023    mild B/L      Past Surgical History:  He has a past surgical history that includes Other surgical history (12/06/2019) and Other surgical history (12/06/2019).      Social History:  He reports that he has been smoking cigars. He has never used smokeless tobacco. He reports current alcohol use of about 6.0 standard drinks of alcohol per week. He reports current drug use. Frequency: 2.00 times per week. Drug: Marijuana.    Family History:  No family history on file.     Allergies:  Penicillins, Clarithromycin, and Bee pollen    Outpatient Medications:  Current Outpatient Medications   Medication Instructions    allopurinol (ZYLOPRIM) 100 mg, oral, Daily    ascorbic acid, vitamin C, 500 mg capsule Take by mouth.    aspirin 81 mg EC tablet 1 tablet, Daily     atorvastatin (LIPITOR) 40 mg, oral, Nightly    carisoprodol (SOMA) 350 mg, oral, 3 times daily PRN    co-enzyme Q-10 100 mg, Daily    cyclobenzaprine (FLEXERIL) 10 mg, oral, 3 times daily PRN    docosahexaenoic acid/epa (FISH OIL ORAL) Fish Oil OIL   Refills: 0       Active    folic acid (FOLVITE) 1 mg, oral, Daily    gabapentin (NEURONTIN) 600 mg, oral, 3 times daily    ibuprofen 600 mg, oral, 3 times daily, Take with acetaminophen 1000 mg    LORazepam (Ativan) 2 mg tablet Take 1 p.o. 1 hour before procedure    metoprolol tartrate (LOPRESSOR) 25 mg, oral, 2 times daily    multivitamin (MULTIPLE VITAMINS ORAL) Daily    omeprazole (PriLOSEC) 40 mg DR capsule One po bid    oxyCODONE (Roxicodone) 5 mg immediate release tablet Take 1-2 tablets (5-10 mg) by mouth every 6 (six) hours if needed for severe pain for up to 7 days    thiamine (VITAMIN B-1) 100 mg, oral, Daily    valsartan-hydrochlorothiazide (Diovan-HCT) 80-12.5 mg tablet 1 tablet, oral, Daily     Last Recorded Vitals:  There were no vitals filed for this visit.    Physical Exam  Patient is alert and oriented x3.  HEENT is unremarkable mucous members are moist  Neck no JVP no bruits upstrokes are full no thyromegaly  Lungs are clear bilaterally.  No wheezing crackles or rales  Heart regular rhythm normal S1-S2 there is no S3 no murmurs are heard.  Abdomen is soft bs are positive nontender nondistended no organomegaly no pulsatile masses  Extremities have no edema.  Distal pulses present palpable.  Neuro is grossly nonfocal  Skin has no rashes     Last Labs:  CBC -  Lab Results   Component Value Date    WBC 9.9 07/01/2024    HGB 15.3 07/01/2024    HCT 43.8 07/01/2024    MCV 92 07/01/2024     07/01/2024     CMP -  Lab Results   Component Value Date    CALCIUM 9.1 07/01/2024    PROT 6.6 07/01/2024    ALBUMIN 4.3 07/01/2024    AST 32 07/01/2024    ALT 34 07/01/2024    ALKPHOS 52 07/01/2024    BILITOT 0.8 07/01/2024     LIPID PANEL -   Lab Results  "  Component Value Date    CHOL 182 12/19/2023    HDL 71.7 12/19/2023    CHHDL 2.5 12/19/2023    VLDL 23 12/19/2023    TRIG 116 12/19/2023    NHDL 110 12/19/2023     RENAL FUNCTION PANEL -   Lab Results   Component Value Date    K 4.4 07/01/2024     No results found for: \"BNP\", \"HGBA1C\"  Procedure    Holter 6/17/2024 sinus 58, 131, 82 bpm, brief PSVT 22 beats, frequent PVCs NSVT    PVRs upper extremities normal    CT chest 7/16/2024 atelectasis    TST 10/9/2023 negative EKG, below average capacity, frequent PVCs, no symptoms, nuclear images normal ejection fraction 60%    Echo 10/9/2023 EF 55 to 60%, DDF    AA U/S [01/11/2021]: No evidence of aneurysm.     ECHO [07/07/2020]: EF 55-60%. SD = impaired relaxation pattern. Aorta mildly dial (3.9 cm). Atrial septal aneurysm present.     NST [02/05/2020]: Normal. There is mildly reduced LVSF w/ex EF 49%.     CAC (11/13/2019) score 594 aga units. Severe disease. Extensive calcified plaque w/in Prox and Mid L anterior descending coronary artery.      CAROTID (05/21/2015) [place] [facility] [place] 16-49% stenosis        Assessment/Plan   1. CAD. Elevated calcium score 594 Agatston units. No EKG today due to BL pink eye.  Continue with aspirin atorvastatin and lisinopril.  Exercise nuclear stress test 10/9/2023 below average capacity, no symptoms, normal EKG, frequent PVCs, nuclear images normal 60% Limitation of activity was due to knee pain and sciatica pain.     2. Hyperlipidemia. Monitored by Dr. Zhu.  12/19/2023 LDL 87 HDL 72 triglycerides 116. Wt loss and more stringent dietary modification will help. Target LDL <70.      3. Hypertension. BP stable.      4. Cardiomyopathy.  Exercise nuclear stress test 10/9/2023 negative for ischemia frequent PVCs ejection fraction 60%.  Ejection fraction on the echo 10/9/2023 55 to 60%    5.  Syncope.  Neuro autonomic testing negative. No evidence of subclavian artery stenosis. Better with compression stockings.     RTC 1 year. EKG today   "     Liat Mccartney MD     Instructions and follow up

## 2025-04-07 ENCOUNTER — APPOINTMENT (OUTPATIENT)
Dept: NEUROSURGERY | Facility: CLINIC | Age: 70
End: 2025-04-07
Payer: MEDICARE

## 2025-04-08 ENCOUNTER — APPOINTMENT (OUTPATIENT)
Dept: PAIN MEDICINE | Facility: CLINIC | Age: 70
End: 2025-04-08
Payer: MEDICARE

## 2025-04-08 NOTE — H&P (VIEW-ONLY)
SUBJECTIVE:  This is 69 y.o.  male with PMH of obesity BMI 28, CAD, cardiomyopathy, PVD, DVT, history of CVA with residual effects presented initially with severe intractable lower back pain with severe right leg radiculopathy pain that started 2 months ago while getting groceries from his car felt back pain and the pain got really bad and started shooting down his leg and could not move.  Patient was treated with gabapentin titration in addition with Soma tab and Percocet with ibuprofen to control his pain and he received on 12/23/2024 left L4-5 TFESI which came from complete resolution of his symptoms with some residual neuropathy that Endy Kumari recommended for him to continue to use gabapentin for it who is here for follow-up who is here virtually without any radicular symptoms only pain in the lower back that increased with extension and lateral bending correlate with the spondylosis.  I explained to her the pathology of his back and I told him that the best course is to try bilateral L3-5 MBB in addition to referral to our chiropractic and physical therapy.  All his patient questions were answered today and he agreed to the plan      Prior office visit:  12/4/2024: The patient was referred to us by Referring Provider: Anabel Miller MD for lower back pain and right leg radiculopathy. this is 69 y.o.  male accompanied with his wife Emily with a past history of obesity BMI 28, CAD, cardiomyopathy, PVD, DVT, history of CVA with residual effects presenting with severe intractable lower back pain with severe right leg radiculopathy pain that started 2 months ago while getting groceries from his car felt back pain and the pain got really bad and started shooting down his leg and could not move.  End up going to PCP who gave him pain medication and and steroid tapered in addition to cyclobenzaprine.  The patient did not get any better his symptoms started getting worse he cannot sleep he cannot put any weight on his  right leg.  The patient is really miserable with his pain.  He ended up going to orthopedic surgeon who eventually ordered stat MRI and waivered the physical therapy requirement and showed large L4-5 disc herniation affecting the L4-5 foramina.  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  The patient is really unable to move and the whole time is just laying in the bed.  I asked him to start taking his oxycodone with acetaminophen and ibuprofen 600 mg together and increase his gabapentin to the 300 mg and I gave him titration schedule.  Cyclobenzaprine did not touch him I gave him Soma tabs to see if that will help with his pain.              Procedures:   12/23/2024 right L4-5 TFESI patient has had 100% improvement in pain function     Portions of record reviewed for pertinent issues: active problem list, medication list, allergies, family history, social history, notes from last encounter, encounters, lab results, imaging and other available records.     I have personally reviewed the [facility] report for this patient. This report is scanned into the electronic medical record. I have considered the risks of abuse, dependence, addiction and diversion. It showed: Oxycodone 5 mg from PCP  OPIOID RISK ASSESSMENT SCORE 0/26  Aberrant behavior: None  My patient has no underlying substance abuse or alcohol abuse and there's no mental health conditions contributing to the patient's pain.           Diagnostic studies:  11/20/2024 MRI of the lumbar  spine did not show any bone marrow edema or endplate change there is multiple degenerative changes with facet hypertrophy but significant disc herniation to the right at the L4-5 affecting the right L5 nerve root:  Findings:     The conus medullaris ends normally. The alignment of the lumbar spine is anatomic.     The vertebral body heights are well maintained. There is no aggressive bone marrow signal abnormality. Heterogeneity of the bone marrow likely secondary to osteopenia/osteoporosis.     Disc desiccation throughout the lumbar spine. Mild intervertebral disc height loss throughout the lumbar spine.     L1-L2: Small disc bulge. No neural foraminal or spinal canal stenosis.     L2-L3: Small disc bulge. No neuroforaminal or spinal canal stenosis.     L3-L4: Small disc bulge. Mild facet arthropathy. Mild bilateral neuroforaminal stenosis. Mild spinal canal stenosis.     L4-L5: Small disc bulge with small superimposed right central disc extrusion measuring approximately 6 mm in AP dimension by 7 mm in transverse dimension coursing inferiorly along the posterior aspect of L5 for a length of approximately 9 mm. This disc extrusion abuts and posteriorly displaces the right L5 nerve within the spinal canal. Moderate facet arthropathy. Ligamentum flavum thickening. Severe spinal canal stenosis. Moderate bilateral neural foraminal stenosis.     L5-S1: Small disc bulge. Moderate left and mild right facet arthropathy. Severe left and moderate right neural foraminal stenosis.     Visualized paravertebral soft tissues appear within normal limits.      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     Social history: , Has 2 children, 4 grandchildren, Finished high school, and Higher education some college denies smoking drinking or use of illicit drugs            Review of Systems   HENT: Negative.     Eyes: Negative.     Respiratory: Negative.     Cardiovascular: Negative.    Gastrointestinal: Negative.    Endocrine: Negative.    Genitourinary: Negative.    Musculoskeletal:  Positive for back pain and myalgias.   Skin: Negative.    Neurological: Negative.    Hematological: Negative.    Psychiatric/Behavioral: Negative.          Physical Exam  Vitals and nursing note reviewed.   Constitutional:       Appearance: Normal appearance.       HENT:      Head: Normocephalic and atraumatic.      Nose: Nose normal.   Eyes:      Extraocular Movements: Extraocular movements intact.      Conjunctiva/sclera: Conjunctivae normal.      Pupils: Pupils are equal, round, and reactive to light.   Cardiovascular:      Rate and Rhythm: Normal rate and regular rhythm.      Pulses: Normal pulses.      Heart sounds: Normal heart sounds.   Pulmonary:      Effort: Pulmonary effort is normal.      Breath sounds: Normal breath sounds.   Abdominal:      General: Abdomen is flat. Bowel sounds are normal.      Palpations: Abdomen is soft.   Skin:     General: Skin is warm.   Neurological:      General: No focal deficit present.      Mental Status: He is alert.      Cranial Nerves: Cranial nerves 2-12 are intact.      Sensory: Sensation is intact.      Motor: Motor function is intact.      Coordination: Coordination is intact.      Gait: Gait is intact.      Deep Tendon Reflexes: Reflexes are normal and symmetric.      Comments: Crisp reflexes in both upper and lower extremities but no clonus   Psychiatric:         Mood and Affect: Mood normal.         Behavior: Behavior normal.                    Plan  At least 50% of the visit was involved in the discussion of the options for treatment. We discussed exercises, medication, interventional therapies and surgery. Healthy life style is essential with patient hard work to achieve the wellness. In addition; discussion with the patient and/or family about any of the diagnostic results, impressions and/or recommended  diagnostic studies, prognosis, risks and benefits of treatment options, instructions for treatment and/or follow-up, importance of compliance with chosen treatment options, risk-factor reduction, and patient/family education.         Recommended Pool therapy, walking in the pool, at least 3x per week for 30 minutes  Continue self-directed physical therapy with at least daily exercises for minimum of 20-minute, brochure was handed to the patient  Referral to physical therapy  Referral to [facility]  Bilateral L3-5 MBB under fluoroscopy guidance  Healthy lifestyle and anti-inflammatory diet in addition to weight control discussed with the patient  Alternative chronic pain therapies was discussed, encouraged and information was handed  Return to Clinic after injection     *Please note this report has been produced using speech recognition software and may contain errors related to that system including grammar, punctuation and spelling as well as words and phrases that may be inappropriate. If there are questions or concerns, please feel free to contact me to clarify.    Luda Drew MD

## 2025-04-08 NOTE — PROGRESS NOTES
This is 69 y.o.  male with who has been treated for Lower back pain. Pain is worse,  patient states that he has noticed that he is starting to bend over slightly again do to his pain . The pain is described as  numbing pain , finds some relief in using tens and rest   .  Here for follow-up.   Chief Complaint   Patient presents with    Follow-up    Back Pain   Three month follow up .    Pain Therapies: rightL4-L5  Lumbar transforaminal epidural steroid injection 12/23/2024 gave him 80 % pain relief lasting approximally two weeks .

## 2025-04-08 NOTE — PROGRESS NOTES
SUBJECTIVE:  This is 69 y.o.  male with PMH of obesity BMI 28, CAD, cardiomyopathy, PVD, DVT, history of CVA with residual effects presented initially with severe intractable lower back pain with severe right leg radiculopathy pain that started 2 months ago while getting groceries from his car felt back pain and the pain got really bad and started shooting down his leg and could not move.  Patient was treated with gabapentin titration in addition with Soma tab and Percocet with ibuprofen to control his pain and he received on 12/23/2024 left L4-5 TFESI which came from complete resolution of his symptoms with some residual neuropathy that Endy Kumari recommended for him to continue to use gabapentin for it who is here for follow-up who is here virtually without any radicular symptoms only pain in the lower back that increased with extension and lateral bending correlate with the spondylosis.  I explained to her the pathology of his back and I told him that the best course is to try bilateral L3-5 MBB in addition to referral to our chiropractic and physical therapy.  All his patient questions were answered today and he agreed to the plan      Prior office visit:  12/4/2024: The patient was referred to us by Referring Provider: Anabel Miller MD for lower back pain and right leg radiculopathy. this is 69 y.o.  male accompanied with his wife Emily with a past history of obesity BMI 28, CAD, cardiomyopathy, PVD, DVT, history of CVA with residual effects presenting with severe intractable lower back pain with severe right leg radiculopathy pain that started 2 months ago while getting groceries from his car felt back pain and the pain got really bad and started shooting down his leg and could not move.  End up going to PCP who gave him pain medication and and steroid tapered in addition to cyclobenzaprine.  The patient did not get any better his symptoms started getting worse he cannot sleep he cannot put any weight on his  right leg.  The patient is really miserable with his pain.  He ended up going to orthopedic surgeon who eventually ordered stat MRI and waivered the physical therapy requirement and showed large L4-5 disc herniation affecting the L4-5 foramina.  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  The patient is really unable to move and the whole time is just laying in the bed.  I asked him to start taking his oxycodone with acetaminophen and ibuprofen 600 mg together and increase his gabapentin to the 300 mg and I gave him titration schedule.  Cyclobenzaprine did not touch him I gave him Soma tabs to see if that will help with his pain.              Procedures:   12/23/2024 right L4-5 TFESI patient has had 100% improvement in pain function     Portions of record reviewed for pertinent issues: active problem list, medication list, allergies, family history, social history, notes from last encounter, encounters, lab results, imaging and other available records.     I have personally reviewed the OARRS report for this patient. This report is scanned into the electronic medical record. I have considered the risks of abuse, dependence, addiction and diversion. It showed: Oxycodone 5 mg from PCP  OPIOID RISK ASSESSMENT SCORE 0/26  Aberrant behavior: None  My patient has no underlying substance abuse or alcohol abuse and there's no mental health conditions contributing to the patient's pain.           Diagnostic studies:  11/20/2024 MRI of the lumbar  spine did not show any bone marrow edema or endplate change there is multiple degenerative changes with facet hypertrophy but significant disc herniation to the right at the L4-5 affecting the right L5 nerve root:  Findings:     The conus medullaris ends normally. The alignment of the lumbar spine is anatomic.     The vertebral body heights are well maintained. There is no aggressive bone marrow signal abnormality. Heterogeneity of the bone marrow likely secondary to osteopenia/osteoporosis.     Disc desiccation throughout the lumbar spine. Mild intervertebral disc height loss throughout the lumbar spine.     L1-L2: Small disc bulge. No neural foraminal or spinal canal stenosis.     L2-L3: Small disc bulge. No neuroforaminal or spinal canal stenosis.     L3-L4: Small disc bulge. Mild facet arthropathy. Mild bilateral neuroforaminal stenosis. Mild spinal canal stenosis.     L4-L5: Small disc bulge with small superimposed right central disc extrusion measuring approximately 6 mm in AP dimension by 7 mm in transverse dimension coursing inferiorly along the posterior aspect of L5 for a length of approximately 9 mm. This disc extrusion abuts and posteriorly displaces the right L5 nerve within the spinal canal. Moderate facet arthropathy. Ligamentum flavum thickening. Severe spinal canal stenosis. Moderate bilateral neural foraminal stenosis.     L5-S1: Small disc bulge. Moderate left and mild right facet arthropathy. Severe left and moderate right neural foraminal stenosis.     Visualized paravertebral soft tissues appear within normal limits.      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     Social history: , Has 2 children, 4 grandchildren, Finished high school, and Higher education some college denies smoking drinking or use of illicit drugs            Review of Systems   HENT: Negative.     Eyes: Negative.     Respiratory: Negative.     Cardiovascular: Negative.    Gastrointestinal: Negative.    Endocrine: Negative.    Genitourinary: Negative.    Musculoskeletal:  Positive for back pain and myalgias.   Skin: Negative.    Neurological: Negative.    Hematological: Negative.    Psychiatric/Behavioral: Negative.          Physical Exam  Vitals and nursing note reviewed.   Constitutional:       Appearance: Normal appearance.       HENT:      Head: Normocephalic and atraumatic.      Nose: Nose normal.   Eyes:      Extraocular Movements: Extraocular movements intact.      Conjunctiva/sclera: Conjunctivae normal.      Pupils: Pupils are equal, round, and reactive to light.   Cardiovascular:      Rate and Rhythm: Normal rate and regular rhythm.      Pulses: Normal pulses.      Heart sounds: Normal heart sounds.   Pulmonary:      Effort: Pulmonary effort is normal.      Breath sounds: Normal breath sounds.   Abdominal:      General: Abdomen is flat. Bowel sounds are normal.      Palpations: Abdomen is soft.   Skin:     General: Skin is warm.   Neurological:      General: No focal deficit present.      Mental Status: He is alert.      Cranial Nerves: Cranial nerves 2-12 are intact.      Sensory: Sensation is intact.      Motor: Motor function is intact.      Coordination: Coordination is intact.      Gait: Gait is intact.      Deep Tendon Reflexes: Reflexes are normal and symmetric.      Comments: Crisp reflexes in both upper and lower extremities but no clonus   Psychiatric:         Mood and Affect: Mood normal.         Behavior: Behavior normal.                    Plan  At least 50% of the visit was involved in the discussion of the options for treatment. We discussed exercises, medication, interventional therapies and surgery. Healthy life style is essential with patient hard work to achieve the wellness. In addition; discussion with the patient and/or family about any of the diagnostic results, impressions and/or recommended  diagnostic studies, prognosis, risks and benefits of treatment options, instructions for treatment and/or follow-up, importance of compliance with chosen treatment options, risk-factor reduction, and patient/family education.         Recommended Pool therapy, walking in the pool, at least 3x per week for 30 minutes  Continue self-directed physical therapy with at least daily exercises for minimum of 20-minute, brochure was handed to the patient  Referral to physical therapy  Referral to [facility]  Bilateral L3-5 MBB under fluoroscopy guidance  Healthy lifestyle and anti-inflammatory diet in addition to weight control discussed with the patient  Alternative chronic pain therapies was discussed, encouraged and information was handed  Return to Clinic after injection     *Please note this report has been produced using speech recognition software and may contain errors related to that system including grammar, punctuation and spelling as well as words and phrases that may be inappropriate. If there are questions or concerns, please feel free to contact me to clarify.    Luda Drew MD

## 2025-04-10 ENCOUNTER — OFFICE VISIT (OUTPATIENT)
Dept: PAIN MEDICINE | Facility: CLINIC | Age: 70
End: 2025-04-10
Payer: MEDICARE

## 2025-04-10 VITALS
SYSTOLIC BLOOD PRESSURE: 161 MMHG | TEMPERATURE: 97.9 F | HEIGHT: 70 IN | BODY MASS INDEX: 30.64 KG/M2 | RESPIRATION RATE: 16 BRPM | OXYGEN SATURATION: 96 % | WEIGHT: 214 LBS | HEART RATE: 75 BPM | DIASTOLIC BLOOD PRESSURE: 91 MMHG

## 2025-04-10 DIAGNOSIS — M47.817 LUMBOSACRAL SPONDYLOSIS WITHOUT MYELOPATHY: Primary | ICD-10-CM

## 2025-04-10 PROCEDURE — 1125F AMNT PAIN NOTED PAIN PRSNT: CPT | Performed by: ANESTHESIOLOGY

## 2025-04-10 PROCEDURE — 3077F SYST BP >= 140 MM HG: CPT | Performed by: ANESTHESIOLOGY

## 2025-04-10 PROCEDURE — 3008F BODY MASS INDEX DOCD: CPT | Performed by: ANESTHESIOLOGY

## 2025-04-10 PROCEDURE — 1157F ADVNC CARE PLAN IN RCRD: CPT | Performed by: ANESTHESIOLOGY

## 2025-04-10 PROCEDURE — 3080F DIAST BP >= 90 MM HG: CPT | Performed by: ANESTHESIOLOGY

## 2025-04-10 PROCEDURE — 99214 OFFICE O/P EST MOD 30 MIN: CPT | Performed by: ANESTHESIOLOGY

## 2025-04-10 PROCEDURE — 1159F MED LIST DOCD IN RCRD: CPT | Performed by: ANESTHESIOLOGY

## 2025-04-10 ASSESSMENT — ENCOUNTER SYMPTOMS
ENDOCRINE NEGATIVE: 1
MYALGIAS: 1
GASTROINTESTINAL NEGATIVE: 1
LOSS OF SENSATION IN FEET: 0
RESPIRATORY NEGATIVE: 1
CARDIOVASCULAR NEGATIVE: 1
HEMATOLOGIC/LYMPHATIC NEGATIVE: 1
PSYCHIATRIC NEGATIVE: 1
BACK PAIN: 1
OCCASIONAL FEELINGS OF UNSTEADINESS: 0
NEUROLOGICAL NEGATIVE: 1
EYES NEGATIVE: 1
DEPRESSION: 0

## 2025-04-10 ASSESSMENT — PAIN - FUNCTIONAL ASSESSMENT: PAIN_FUNCTIONAL_ASSESSMENT: 0-10

## 2025-04-10 ASSESSMENT — PAIN DESCRIPTION - DESCRIPTORS: DESCRIPTORS: NUMBNESS

## 2025-04-10 ASSESSMENT — PAIN SCALES - GENERAL
PAINLEVEL_OUTOF10: 6
PAINLEVEL_OUTOF10: 6

## 2025-04-28 ENCOUNTER — HOSPITAL ENCOUNTER (OUTPATIENT)
Dept: PAIN MEDICINE | Facility: CLINIC | Age: 70
Discharge: HOME | End: 2025-04-28
Payer: MEDICARE

## 2025-04-28 VITALS
RESPIRATION RATE: 18 BRPM | TEMPERATURE: 98.4 F | BODY MASS INDEX: 30.64 KG/M2 | HEART RATE: 69 BPM | SYSTOLIC BLOOD PRESSURE: 149 MMHG | OXYGEN SATURATION: 93 % | WEIGHT: 214 LBS | DIASTOLIC BLOOD PRESSURE: 91 MMHG | HEIGHT: 70 IN

## 2025-04-28 DIAGNOSIS — M47.817 LUMBOSACRAL SPONDYLOSIS WITHOUT MYELOPATHY: ICD-10-CM

## 2025-04-28 PROCEDURE — 64493 INJ PARAVERT F JNT L/S 1 LEV: CPT | Performed by: ANESTHESIOLOGY

## 2025-04-28 PROCEDURE — 64494 INJ PARAVERT F JNT L/S 2 LEV: CPT | Mod: 50 | Performed by: ANESTHESIOLOGY

## 2025-04-28 PROCEDURE — 2500000004 HC RX 250 GENERAL PHARMACY W/ HCPCS (ALT 636 FOR OP/ED): Performed by: ANESTHESIOLOGY

## 2025-04-28 PROCEDURE — 64493 INJ PARAVERT F JNT L/S 1 LEV: CPT | Mod: 50 | Performed by: ANESTHESIOLOGY

## 2025-04-28 PROCEDURE — 64494 INJ PARAVERT F JNT L/S 2 LEV: CPT | Performed by: ANESTHESIOLOGY

## 2025-04-28 RX ORDER — TRIAMCINOLONE ACETONIDE 40 MG/ML
INJECTION, SUSPENSION INTRA-ARTICULAR; INTRAMUSCULAR AS NEEDED
Status: COMPLETED | OUTPATIENT
Start: 2025-04-28 | End: 2025-04-28

## 2025-04-28 RX ORDER — ROPIVACAINE HYDROCHLORIDE 5 MG/ML
INJECTION, SOLUTION EPIDURAL; INFILTRATION; PERINEURAL AS NEEDED
Status: COMPLETED | OUTPATIENT
Start: 2025-04-28 | End: 2025-04-28

## 2025-04-28 RX ORDER — LIDOCAINE HYDROCHLORIDE 5 MG/ML
INJECTION, SOLUTION INFILTRATION; INTRAVENOUS AS NEEDED
Status: COMPLETED | OUTPATIENT
Start: 2025-04-28 | End: 2025-04-28

## 2025-04-28 RX ADMIN — TRIAMCINOLONE ACETONIDE 40 MG: 40 INJECTION, SUSPENSION INTRA-ARTICULAR; INTRAMUSCULAR at 09:00

## 2025-04-28 RX ADMIN — ROPIVACAINE HYDROCHLORIDE 10 ML: 5 INJECTION, SOLUTION EPIDURAL; INFILTRATION; PERINEURAL at 09:00

## 2025-04-28 RX ADMIN — LIDOCAINE HYDROCHLORIDE 10 ML: 5 INJECTION, SOLUTION INFILTRATION at 08:59

## 2025-04-28 ASSESSMENT — PAIN - FUNCTIONAL ASSESSMENT: PAIN_FUNCTIONAL_ASSESSMENT: 0-10

## 2025-04-28 ASSESSMENT — PAIN SCALES - GENERAL
PAINLEVEL_OUTOF10: 9
PAINLEVEL_OUTOF10: 9

## 2025-04-28 NOTE — Clinical Note
Patient tolerated the procedure well and is comfortable with no complaints of pain. Vital signs stable. Arousable prior to transport. Patient transported to PACU via stretcher. Handoff completed. Debrief done

## 2025-05-13 ENCOUNTER — APPOINTMENT (OUTPATIENT)
Dept: PAIN MEDICINE | Facility: CLINIC | Age: 70
End: 2025-05-13
Payer: MEDICARE

## 2025-05-13 ENCOUNTER — APPOINTMENT (OUTPATIENT)
Dept: PRIMARY CARE | Facility: CLINIC | Age: 70
End: 2025-05-13
Payer: MEDICARE

## 2025-05-13 VITALS
DIASTOLIC BLOOD PRESSURE: 95 MMHG | HEART RATE: 67 BPM | HEIGHT: 70 IN | BODY MASS INDEX: 30.92 KG/M2 | SYSTOLIC BLOOD PRESSURE: 159 MMHG | OXYGEN SATURATION: 96 % | WEIGHT: 216 LBS

## 2025-05-13 DIAGNOSIS — Z00.00 ROUTINE GENERAL MEDICAL EXAMINATION AT HEALTH CARE FACILITY: Primary | ICD-10-CM

## 2025-05-13 DIAGNOSIS — I10 BENIGN ESSENTIAL HTN: ICD-10-CM

## 2025-05-13 DIAGNOSIS — Z12.5 SPECIAL SCREENING FOR MALIGNANT NEOPLASM OF PROSTATE: ICD-10-CM

## 2025-05-13 LAB
ALBUMIN SERPL-MCNC: 4.4 G/DL (ref 3.6–5.1)
ALP SERPL-CCNC: 51 U/L (ref 35–144)
ALT SERPL-CCNC: 23 U/L (ref 9–46)
ANION GAP SERPL CALCULATED.4IONS-SCNC: 10 MMOL/L (CALC) (ref 7–17)
AST SERPL-CCNC: 16 U/L (ref 10–35)
BILIRUB SERPL-MCNC: 0.9 MG/DL (ref 0.2–1.2)
BUN SERPL-MCNC: 25 MG/DL (ref 7–25)
CALCIUM SERPL-MCNC: 9.2 MG/DL (ref 8.6–10.3)
CHLORIDE SERPL-SCNC: 103 MMOL/L (ref 98–110)
CHOLEST SERPL-MCNC: 174 MG/DL
CHOLEST/HDLC SERPL: 2.2 (CALC)
CO2 SERPL-SCNC: 27 MMOL/L (ref 20–32)
CREAT SERPL-MCNC: 1.16 MG/DL (ref 0.7–1.28)
EGFRCR SERPLBLD CKD-EPI 2021: 67 ML/MIN/1.73M2
GLUCOSE SERPL-MCNC: 127 MG/DL (ref 65–99)
HDLC SERPL-MCNC: 80 MG/DL
LDLC SERPL CALC-MCNC: 80 MG/DL (CALC)
NONHDLC SERPL-MCNC: 94 MG/DL (CALC)
POTASSIUM SERPL-SCNC: 4.2 MMOL/L (ref 3.5–5.3)
PROT SERPL-MCNC: 6.7 G/DL (ref 6.1–8.1)
PSA SERPL-MCNC: 0.26 NG/ML
SODIUM SERPL-SCNC: 140 MMOL/L (ref 135–146)
TRIGL SERPL-MCNC: 62 MG/DL

## 2025-05-13 PROCEDURE — 3080F DIAST BP >= 90 MM HG: CPT | Performed by: FAMILY MEDICINE

## 2025-05-13 PROCEDURE — 3077F SYST BP >= 140 MM HG: CPT | Performed by: FAMILY MEDICINE

## 2025-05-13 PROCEDURE — 99497 ADVNCD CARE PLAN 30 MIN: CPT | Performed by: FAMILY MEDICINE

## 2025-05-13 PROCEDURE — 1170F FXNL STATUS ASSESSED: CPT | Performed by: FAMILY MEDICINE

## 2025-05-13 PROCEDURE — G0439 PPPS, SUBSEQ VISIT: HCPCS | Performed by: FAMILY MEDICINE

## 2025-05-13 PROCEDURE — G0444 DEPRESSION SCREEN ANNUAL: HCPCS | Performed by: FAMILY MEDICINE

## 2025-05-13 PROCEDURE — 3008F BODY MASS INDEX DOCD: CPT | Performed by: FAMILY MEDICINE

## 2025-05-13 PROCEDURE — 99214 OFFICE O/P EST MOD 30 MIN: CPT | Performed by: FAMILY MEDICINE

## 2025-05-13 ASSESSMENT — ENCOUNTER SYMPTOMS
GASTROINTESTINAL NEGATIVE: 1
ARTHRALGIAS: 1
CONSTITUTIONAL NEGATIVE: 1
RESPIRATORY NEGATIVE: 1
CARDIOVASCULAR NEGATIVE: 1
BACK PAIN: 1

## 2025-05-13 ASSESSMENT — ACTIVITIES OF DAILY LIVING (ADL)
TAKING_MEDICATION: INDEPENDENT
DRESSING: INDEPENDENT
BATHING: INDEPENDENT
MANAGING_FINANCES: INDEPENDENT
DOING_HOUSEWORK: INDEPENDENT
GROCERY_SHOPPING: INDEPENDENT

## 2025-05-13 NOTE — PROGRESS NOTES
"Subjective   Reason for Visit: Easton Ragland is an 70 y.o. male here for a Medicare Wellness visit.   Patient being seen for chief complaint being addressed we also needed to review patient's past medical history due to his complex medical problems we went over his significant other medical issues individually reviewed his medications and did an overview of his past labs.  And medications  Discussed depression screen with patient for 5 min   Discussed living will and DNR with patient and spent 16 min doing so. Pts questions and concerns reviewed.   Htn   Chronic back pain              HPI    Patient Care Team:  Paolo Zhu DO as PCP - General (Family Medicine)  Paolo Zhu DO as PCP - MMO Medicare Advantage PCP  Liat Mccartney MD as Consulting Physician (Cardiology)  CHANTELL Galindo-CNP as Nurse Practitioner (Cardiology)     Review of Systems   Constitutional: Negative.    HENT: Negative.     Respiratory: Negative.     Cardiovascular: Negative.    Gastrointestinal: Negative.    Genitourinary: Negative.    Musculoskeletal:  Positive for arthralgias, back pain and gait problem.       Objective   Vitals:  BP (!) 159/95   Pulse 67   Ht 1.778 m (5' 10\")   Wt 98 kg (216 lb)   SpO2 96%   BMI 30.99 kg/m²       Physical Exam  Constitutional:       Appearance: Normal appearance.   HENT:      Head: Normocephalic.      Nose: Nose normal.      Mouth/Throat:      Mouth: Mucous membranes are moist.   Eyes:      Extraocular Movements: Extraocular movements intact.      Pupils: Pupils are equal, round, and reactive to light.   Cardiovascular:      Rate and Rhythm: Normal rate and regular rhythm.   Pulmonary:      Effort: Pulmonary effort is normal.   Abdominal:      General: Abdomen is flat.   Musculoskeletal:         General: Normal range of motion.      Cervical back: Normal range of motion.   Neurological:      Mental Status: He is alert.         Assessment & Plan  Routine general medical examination at [facility] " care facility    Orders:    1 Year Follow Up In Primary Care - Wellness Exam; Future    Comprehensive Metabolic Panel; Future    Lipid Panel; Future    Special screening for malignant neoplasm of prostate    Orders:    Comprehensive Metabolic Panel; Future    Lipid Panel; Future    Prostate Specific Antigen; Future    Benign essential HTN    Orders:    Comprehensive Metabolic Panel; Future    Lipid Panel; Future

## 2025-05-19 DIAGNOSIS — R73.9 HYPERGLYCEMIA: Primary | ICD-10-CM

## 2025-05-21 DIAGNOSIS — E78.00 HYPERCHOLESTEROLEMIA: Chronic | ICD-10-CM

## 2025-05-22 RX ORDER — ATORVASTATIN CALCIUM 40 MG/1
40 TABLET, FILM COATED ORAL NIGHTLY
Qty: 90 TABLET | Refills: 3 | Status: SHIPPED | OUTPATIENT
Start: 2025-05-22

## 2025-05-24 LAB
ATRIAL RATE: 65 BPM
EST. AVERAGE GLUCOSE BLD GHB EST-MCNC: 151 MG/DL
EST. AVERAGE GLUCOSE BLD GHB EST-SCNC: 8.4 MMOL/L
HBA1C MFR BLD: 6.9 %
P AXIS: 65 DEGREES
P OFFSET: 199 MS
P ONSET: 141 MS
PR INTERVAL: 160 MS
Q ONSET: 221 MS
QRS COUNT: 11 BEATS
QRS DURATION: 90 MS
QT INTERVAL: 402 MS
QTC CALCULATION(BAZETT): 418 MS
QTC FREDERICIA: 412 MS
R AXIS: 28 DEGREES
T AXIS: 18 DEGREES
T OFFSET: 422 MS
VENTRICULAR RATE: 65 BPM

## 2025-06-04 ENCOUNTER — EVALUATION (OUTPATIENT)
Dept: PHYSICAL THERAPY | Facility: CLINIC | Age: 70
End: 2025-06-04
Payer: MEDICARE

## 2025-06-04 DIAGNOSIS — M47.817 LUMBOSACRAL SPONDYLOSIS WITHOUT MYELOPATHY: ICD-10-CM

## 2025-06-04 PROCEDURE — 97161 PT EVAL LOW COMPLEX 20 MIN: CPT | Mod: GP

## 2025-06-04 ASSESSMENT — ENCOUNTER SYMPTOMS
LOSS OF SENSATION IN FEET: 0
OCCASIONAL FEELINGS OF UNSTEADINESS: 1
DEPRESSION: 0

## 2025-06-04 NOTE — PROGRESS NOTES
" Physical Therapy Evaluation    Patient Name Easton Ragland  MRN: [id number]  Today's Date: 6/4/2025  Time Calculation  Start Time: 0748  Stop Time: 0830  Time Calculation (min): 42 min      Assessment:  The pt presents with low back pain with radicular symptoms on the L that have been decreased since his last injection.    The pt has decreased ROM, flexibility, strength and balance.  The pt has decreased tolerance for functional activities.  The pt is an excellent candidate for skilled therapy to address the above listed limitations and to improve tolerance for functional activities     Stable and/or uncomplicated characteristics    Level of complexity:  low    Impression:  Skilled PT services will aid in advancing functional status and attaining therapy goals.    Problem List:  -activity limitations  -Functional limitations  -Pain L-S spine  -decreased  ROM  -decreased strength   -decreased flexibility  -posture awareness/ body mechanics  -decreased knowledge of HEP  -balance    Goals:  STG: Pt (I) with initial HEP; By week 3; Goal     LTG Goals: 6/4/25  By week: 8-12    Pain: Decrease back LE pain to 1/10 or < with normal activities ; Goal     Posture: pt to demonstrate postural and body mechanics awareness ; Goal     Gait:  Pt to ambulate (I) without an assistive device on all surfaces, community distances, without limitation from pain ; Goal     Stairs: Pt to negotiate 10+ steps reciprocally (I) with 1 railing (I) ; Goal        Balance: SLS = B 15\" or > ; Tandum = B 30\" +  ; Goal     ROM: Increase lumbar ROM to WFL without limitation from pain. ;  Goal     Strength: Increase core and B LE strength to 4+/5 or > grossly for improved tolerance for functional activities. ; Goal     Flexibility: Improve flexibility of B LEs to WFL  ; Goal     Palpation: Decrease pain with palpation by 50% or > ; Goal    HEP: Pt to be (I) with HEP  ; Goal     Outcome Measures: Oswestry 5/50 or < ; Goal     Rehab Potential: " excellent    Plan:  Evaluation, Re-evaluation, Therapeutic Exercise, Therapeutic Activity; Neuromuscular reeducation; Postural Education; Body Mechanics; Home Exercise Program; Manual Techniques; Cold Pack; E-Stim; Ultrasound; Gait/ Stair/ Balance Training; Aquatics PRN, IMDN PRN      1-2 x week  until goals met or maximum rehab potential met      Plan of care was designed with input and agreement by the patient        Therapy Diagnoses:   Problem List Items Addressed This Visit           ICD-10-CM    Lumbosacral spondylosis without myelopathy M47.817    Relevant Orders    Follow Up In Physical Therapy       General:  Reason for visit: Lumbosacral pain   Referred by: Dr Rajendra Banerjee MD appt:  unknown  Preferred Name:  Easton  Script:  eval and treat  Onset Date:  4/10/25  Email: [email or web address]    Insurance:  - MMO, med neces, 100%, copay $30  -authorization required: no    Subjective:    Precautions:     Current Medical management:     PMHx: HTN, Heart Disease, Stroke (TIA) , hernia (not fixed)       Diagnostic Tests: MRI 11/24  Disc desiccation throughout the lumbar spine. Mild intervertebral disc height loss throughout the lumbar spine.     L1-L2: Small disc bulge. No neural foraminal or spinal canal stenosis.     L2-L3: Small disc bulge. No neuroforaminal or spinal canal stenosis.     L3-L4: Small disc bulge. Mild facet arthropathy. Mild bilateral neuroforaminal stenosis. Mild spinal canal stenosis.     L4-L5: Small disc bulge with small superimposed right central disc extrusion measuring approximately 6 mm in AP dimension by 7 mm in transverse dimension coursing inferiorly along the posterior aspect of L5 for a length of approximately 9 mm. This disc extrusion abuts and posteriorly displaces the right L5 nerve within the spinal canal. Moderate facet arthropathy. Ligamentum flavum thickening. Severe spinal canal stenosis. Moderate bilateral neural foraminal stenosis.     L5-S1: Small disc bulge. Moderate left  "and mild right facet arthropathy. Severe left and moderate right neural foraminal stenosis.     Fall risk: Steadi 8, moderate fall risk.     Current Episode of Functional Impairment and/or Pain : The pt hurt his low back and had pain down the L LE.  11/24 he was taking groceries out of the car and he had pain. He had his first injection 12/24.  He stopped the gabapentin after that and then his pain returned. He resumed gabapentin at night and that helped somewhat, but he still had pain and it increased 4/25/25 when he rotated too far in the car. He had 4/28/25 had injections at L 3-5 B and since then pain has been 0.  He did have some soreness when he was putting something together and his pain was 3-4/10, but he did not have pain into the LE.     Pain       Pain assessment 0-10  Pain score: at times 3-4/10 since last injection  Pain location: low back, L    Exacerbating Factors: bending forward, standing  Relieving Factors: medication,     Functional Limitations:  Functional Limitations: Lifting, Walking > x minutes, Standing > 10 minutes, and Sport golf    Home Living Situation: Lives with spouse    Prior Level of Function: (I), WNL    Patient Goal For Therapy: To decrease pain and increase tolerance for normal activities.     Occupation: Retired    Hobbies: 10 miles on bike, was doing weights before recent injury, golf    Objective:      Pain:           Back pain average 3-4/10 at times since injection.    Posture:           The pt had rounded shoulders, forward head and decreased lumbar lordosis. The pt has decreased postural and body mechanics awareness.    Gait:            The pt is (I) without an assistive device on even surfaces    Stairs:           N/a     Balance:           SLS R 3 , L 1-2 seconds , modified tandum B ~ 10 seconds    ROM:           Lumbar flexion 11 \" fingers from floor                        extension 50 % denies pain                        SB R 22\", L 22 \" fingers from floor               " "          Rotation R 40 % , L 40 %     Strength:           Abdominals 4-/5          Back extensors 4-/5 cramping with bridging          The pt was able to toe and heel walk slightly more difficult with heel walking          Hip flexors R/L 5/5-          Hip abductors R/L n/a          Hip adductors R/L n/a          Hip extensors R/L n/a          Quads R/L 5/5-          Hams R/L 5/5-          DF R/L 5/4+          PF R/L 5/5          Great toe extensors R/L n/a    Flexibility:           Hamstrings R - 42 degrees, L - 46 degrees;          Gluteals R mod , L max restriction          Piriformis R mod-max , L max restriction          Hip adductors R 32 , L 31          Quads R n/a, L n/a restriction          Hip flexors R n/a , L n/a restriction    Transfers:            Sit to stand (I)          Supine to sit (I)    Special tests:          Leg length: in supine = ; in long sitting n/a ; SLR more painful and restricted on L  ; Slump n/a     Palpation:           Tenderness with palpation of n/a       Ortho:  Outcome Measures:  Other Measures  Oswestry Disablity Index (SMITA): 13/50     Treatment:    Evaluation:  Evaluation Complexity: Low: 38 minutes; Moderate   minutes; Complex PT Evaluation Time Entry: 38;  minutes    **= HEP  NV= Next visit  np= not performed  nb= non-billable  G= group HEP= discharged to HEP    Therapeutic Exercise: 4  SKTC: 5\" x 5 each **    NMR:         Education: Pt educated on dx, POC, anatomy, posture, ther ex technique and HEP  Preferred learning:  pictures, demonstration.  Demonstrated good understanding.      Access Code: [email or web address]  URL: [email or web address]  Date: 06/04/2025  Prepared by: Leila De Jesus    Exercises  - Hooklying Single Knee to Chest Stretch  - 2 x daily - 7 x weekly - 1-2 sets - 10 reps - 5-10 hold  "

## 2025-06-10 ENCOUNTER — TREATMENT (OUTPATIENT)
Dept: PHYSICAL THERAPY | Facility: CLINIC | Age: 70
End: 2025-06-10
Payer: MEDICARE

## 2025-06-10 DIAGNOSIS — M47.817 LUMBOSACRAL SPONDYLOSIS WITHOUT MYELOPATHY: ICD-10-CM

## 2025-06-10 PROCEDURE — 97110 THERAPEUTIC EXERCISES: CPT | Mod: GP,CQ

## 2025-06-10 PROCEDURE — 97140 MANUAL THERAPY 1/> REGIONS: CPT | Mod: GP,CQ

## 2025-06-10 NOTE — PROGRESS NOTES
Physical Therapy  Physical Therapy Progress Note    Patient Name Easton Ragland   MRN: [id number]  Today's Date: 6/10/2025  Time Calculation  Start Time: 0956  Stop Time: 1040  Time Calculation (min): 44 min    Insurance:    Visit #:   2   Reason for visit: Lumbosacral pain   Referred by: Dr Rajendra Banerjee MD appt:  unknown  Preferred Name:  Easton  Script:  eval and treat  Onset Date:  4/10/25  Email: [email or web address]     Therapy Diagnoses:   Problem List Items Addressed This Visit           ICD-10-CM    Lumbosacral spondylosis without myelopathy M47.817       General:   MMO, med neces, 100%, copay $30  -authorization required: no     Subjective:   Patient reports:  feeling good today. Pain free upon arrival, did have pain yesterday at 3-4. Wants to see if his brace is ok to use. Had to take of his wife this weekend and went up /down the stairs a lot, so feels he did  a lot of movement this weekend. Riding the stationary bike 10 miles.    Have you fallen since last visit:  no    Precautions:    Fall risk: Steadi 8, moderate fall risk.   Pain:  0/10  Location/Type of pain:  LB    HEP compliance/understanding:  patient reports compliance with the instructed home exercises.    Objective:   Objective Measurements:    Function:   working on putting patio set together/at times from seated position  Posture:   Gait:  Balance:   ROM:    Strength:  Flexibility:      Treatment:   **= HEP progression today NV= Next visit  np= not performed  nb= non-billable  G= group HEP= discharged to [facility]  Therapeutic Exercise:     34 minutes  Nu step L3 8 min  Standing hamstring stretch 30 sec 3x **  Standing adductor stretch 30 sec 3x **  SKC 10 sec 10x   Supine piriformis stretch 20 sec 3x   Reviewed back brace with pt.     Manual Therapy:     10 minutes  B sidely for foam roll to piriformis with pillow between knees 5 ' each side     Neuromuscular Re-education:      minutes      Modalities:       Vasopneumatic Device        minutes  Electrical Stimulation          minutes  Ultrasound            minutes  Iontophoresis                     minutes  Cold Pack            minutes        Education:  exercise progression    Assessment:  Patient tolerated treatment well, did well with progression this date.  Reviewed and update flexibility exercises and issued in writing for HEP. Initiated foam rolling to each piriformis as at times pt. Has some cramping with piriformis stretch.  Patient needs continued work on/skilled PT for: ROM/.flexibility /strengthening to address remaining functional, objective and subjective deficits to allow them to return to prior /optimal level of function with ADLs.  Patient is progressing with goals: compliant with HEP  Skilled care:  exercise progression/manual     Plan:    Continue to progress per poc:   NV add supine core stabilization    HEP:    Access Code: [email or web address]  URL: [email or web address]  Date: 06/10/2025  Prepared by: Tyesha    Exercises  - Hooklying Single Knee to Chest Stretch  - 2 x daily - 7 x weekly - 1-2 sets - 10 reps - 5-10 hold  - Side Lunge Adductor Stretch  - 1 x daily - 7 x weekly - 1 sets - 3 reps - 30 hold  - Standing Hamstring Stretch on Chair  - 1 x daily - 7 x weekly - 1 sets - 3 reps - 30 hold  - Supine Piriformis Stretch with Foot on Ground  - 1 x daily - 7 x weekly - 1 sets - 3 reps - 30 hold

## 2025-06-11 NOTE — H&P (VIEW-ONLY)
SUBJECTIVE:  This is 70 y.o.  male with PMH of obesity BMI 28, CAD, cardiomyopathy, PVD, DVT, history of CVA with residual effects presented initially with severe intractable lower back pain with severe right leg radiculopathy pain that started the beginning of 2025.  Patient is treated with gabapentin 300 mg x 2 x 3 daily and received left L4-5 TFESI which took away all his radicular symptoms and him having lower back pain with spondylosis which treated with bilateral L3-5 MBB on 4/28/2025 who is here for follow-up stating he was very happy with this injection he had 90% relief for a whole month and his back pain still better than before but is getting worse especially after using it and going up and down 25 steps in his house.  Patient is only taking the gabapentin at night and he is thinking to advance it.  I explained to him about the policy of repeating the medial branch block and if he gets the same result we can proceed to radiofrequency ablation for longer pain relief and recovery.      Prior office visit:  4/10/2025: This is 69 y.o.  male with PMH of obesity BMI 28, CAD, cardiomyopathy, PVD, DVT, history of CVA with residual effects presented initially with severe intractable lower back pain with severe right leg radiculopathy pain that started 2 months ago while getting groceries from his car felt back pain and the pain got really bad and started shooting down his leg and could not move.  Patient was treated with gabapentin titration in addition with Soma tab and Percocet with ibuprofen to control his pain and he received on 12/23/2024 left L4-5 TFESI which came from complete resolution of his symptoms with some residual neuropathy that Endy Kumari recommended for him to continue to use gabapentin for it who is here for follow-up who is here virtually without any radicular symptoms only pain in the lower back that increased with extension and lateral bending correlate with the spondylosis.  I explained to  her the pathology of his back and I told him that the best course is to try bilateral L3-5 MBB in addition to referral to our chiropractic and physical therapy.  All his patient questions were answered today and he agreed to the plan  Plan  Referral to [facility]  Bilateral L3-5 MBB under fluoroscopy guidance      Procedures:   4/28/2025 bilateral L3-5 MBB patient has had 90% improvement in pain function  12/23/2024 right L4-5 TFESI patient has had 100% improvement in pain function     Portions of record reviewed for pertinent issues: active problem list, medication list, allergies, family history, social history, notes from last encounter, encounters, lab results, imaging and other available records.     I have personally reviewed the OAR[facility] report for this patient. This report is scanned into the electronic medical record. I have considered the risks of abuse, dependence, addiction and diversion. It showed: Oxycodone 5 mg from PCP  OPIOID RISK ASSESSMENT SCORE 0/26  Aberrant behavior: None  My patient has no underlying substance abuse or alcohol abuse and there's no mental health conditions contributing to the patient's pain.           Diagnostic studies:  11/20/2024 MRI of the lumbar spine did not show any bone marrow edema or endplate change there is multiple degenerative changes with facet hypertrophy but significant disc herniation to the right at the L4-5 affecting the right L5 nerve root:  Findings:     The conus medullaris ends normally. The alignment of the lumbar spine is anatomic.     The vertebral body heights are well maintained. There is no aggressive bone marrow signal abnormality. Heterogeneity of the bone marrow likely secondary to osteopenia/osteoporosis.     Disc desiccation throughout the lumbar spine. Mild intervertebral disc height loss throughout the lumbar spine.     L1-L2: Small disc bulge. No neural foraminal or spinal canal stenosis.     L2-L3: Small disc bulge. No neuroforaminal or spinal  canal stenosis.     L3-L4: Small disc bulge. Mild facet arthropathy. Mild bilateral neuroforaminal stenosis. Mild spinal canal stenosis.     L4-L5: Small disc bulge with small superimposed right central disc extrusion measuring approximately 6 mm in AP dimension by 7 mm in transverse dimension coursing inferiorly along the posterior aspect of L5 for a length of approximately 9 mm. This disc extrusion abuts and posteriorly displaces the right L5 nerve within the spinal canal. Moderate facet arthropathy. Ligamentum flavum thickening. Severe spinal canal stenosis. Moderate bilateral neural foraminal stenosis.     L5-S1: Small disc bulge. Moderate left and mild right facet arthropathy. Severe left and moderate right neural foraminal stenosis.     Visualized paravertebral soft tissues appear within normal limits.      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     Social history: , Has 2 children, 4 grandchildren, Finished high school, and Higher education some college denies smoking drinking or use of illicit drugs              Review of Systems   HENT: Negative.     Eyes: Negative.    Respiratory: Negative.     Cardiovascular: Negative.    Gastrointestinal: Negative.    Endocrine: Negative.    Genitourinary: Negative.    Musculoskeletal:  Positive for back pain and myalgias.   Skin: Negative.    Neurological: Negative.    Hematological: Negative.    Psychiatric/Behavioral: Negative.           Physical Exam  Vitals and nursing note reviewed.   Constitutional:       Appearance: Normal appearance.        HENT:      Head: Normocephalic and atraumatic.      Nose: Nose normal.   Eyes:      Extraocular Movements: Extraocular movements intact.      Conjunctiva/sclera: Conjunctivae normal.      Pupils: Pupils are equal, round, and reactive to light.   Cardiovascular:      Rate and Rhythm: Normal rate and regular rhythm.       Pulses: Normal pulses.      Heart sounds: Normal heart sounds.   Pulmonary:      Effort: Pulmonary effort is normal.      Breath sounds: Normal breath sounds.   Abdominal:      General: Abdomen is flat. Bowel sounds are normal.      Palpations: Abdomen is soft.   Skin:     General: Skin is warm.   Neurological:      General: No focal deficit present.      Mental Status: He is alert.      Cranial Nerves: Cranial nerves 2-12 are intact.      Sensory: Sensation is intact.      Motor: Motor function is intact.      Coordination: Coordination is intact.      Gait: Gait is intact.      Deep Tendon Reflexes: Reflexes are normal and symmetric.      Comments: Crisp reflexes in both upper and lower extremities but no clonus   Psychiatric:         Mood and Affect: Mood normal.         Behavior: Behavior normal.            Plan  At least 50% of the visit was involved in the discussion of the options for treatment. We discussed exercises, medication, interventional therapies and surgery. Healthy life style is essential with patient hard work to achieve the wellness. In addition; discussion with the patient and/or family about any of the diagnostic results, impressions and/or recommended diagnostic studies, prognosis, risks and benefits of treatment options, instructions for treatment and/or follow-up, importance of compliance with chosen treatment options, risk-factor reduction, and patient/family education.         Continue self-directed physical therapy at least daily exercises for minimum of 20-minute  Emphasized to the patient to do core exercises and stretching exercises at night and in the morning start with stretching exercises followed by core exercises  Stretching and core exercises sent to the patient chart  Repeat bilateral L3-5 MBB  Healthy lifestyle and anti-inflammatory diet in addition to weight control discussed with the patient  Alternative chronic pain therapies was discussed, encouraged and information was  handed  Return to Clinic after the injection     *Please note this report has been produced using speech recognition software and may contain errors related to that system including grammar, punctuation and spelling as well as words and phrases that may be inappropriate. If there are questions or concerns, please feel free to contact me to clarify.    Luda Drew MD

## 2025-06-11 NOTE — PROGRESS NOTES
SUBJECTIVE:  This is 70 y.o.  male with PMH of obesity BMI 28, CAD, cardiomyopathy, PVD, DVT, history of CVA with residual effects presented initially with severe intractable lower back pain with severe right leg radiculopathy pain that started the beginning of 2025.  Patient is treated with gabapentin 300 mg x 2 x 3 daily and received left L4-5 TFESI which took away all his radicular symptoms and him having lower back pain with spondylosis which treated with bilateral L3-5 MBB on 4/28/2025 who is here for follow-up stating he was very happy with this injection he had 90% relief for a whole month and his back pain still better than before but is getting worse especially after using it and going up and down 25 steps in his house.  Patient is only taking the gabapentin at night and he is thinking to advance it.  I explained to him about the policy of repeating the medial branch block and if he gets the same result we can proceed to radiofrequency ablation for longer pain relief and recovery.      Prior office visit:  4/10/2025: This is 69 y.o.  male with PMH of obesity BMI 28, CAD, cardiomyopathy, PVD, DVT, history of CVA with residual effects presented initially with severe intractable lower back pain with severe right leg radiculopathy pain that started 2 months ago while getting groceries from his car felt back pain and the pain got really bad and started shooting down his leg and could not move.  Patient was treated with gabapentin titration in addition with Soma tab and Percocet with ibuprofen to control his pain and he received on 12/23/2024 left L4-5 TFESI which came from complete resolution of his symptoms with some residual neuropathy that Endy Kumari recommended for him to continue to use gabapentin for it who is here for follow-up who is here virtually without any radicular symptoms only pain in the lower back that increased with extension and lateral bending correlate with the spondylosis.  I explained to  her the pathology of his back and I told him that the best course is to try bilateral L3-5 MBB in addition to referral to our chiropractic and physical therapy.  All his patient questions were answered today and he agreed to the plan  Plan  Referral to [facility]  Bilateral L3-5 MBB under fluoroscopy guidance      Procedures:   4/28/2025 bilateral L3-5 MBB patient has had 90% improvement in pain function  12/23/2024 right L4-5 TFESI patient has had 100% improvement in pain function     Portions of record reviewed for pertinent issues: active problem list, medication list, allergies, family history, social history, notes from last encounter, encounters, lab results, imaging and other available records.     I have personally reviewed the OAR[facility] report for this patient. This report is scanned into the electronic medical record. I have considered the risks of abuse, dependence, addiction and diversion. It showed: Oxycodone 5 mg from PCP  OPIOID RISK ASSESSMENT SCORE 0/26  Aberrant behavior: None  My patient has no underlying substance abuse or alcohol abuse and there's no mental health conditions contributing to the patient's pain.           Diagnostic studies:  11/20/2024 MRI of the lumbar spine did not show any bone marrow edema or endplate change there is multiple degenerative changes with facet hypertrophy but significant disc herniation to the right at the L4-5 affecting the right L5 nerve root:  Findings:     The conus medullaris ends normally. The alignment of the lumbar spine is anatomic.     The vertebral body heights are well maintained. There is no aggressive bone marrow signal abnormality. Heterogeneity of the bone marrow likely secondary to osteopenia/osteoporosis.     Disc desiccation throughout the lumbar spine. Mild intervertebral disc height loss throughout the lumbar spine.     L1-L2: Small disc bulge. No neural foraminal or spinal canal stenosis.     L2-L3: Small disc bulge. No neuroforaminal or spinal  canal stenosis.     L3-L4: Small disc bulge. Mild facet arthropathy. Mild bilateral neuroforaminal stenosis. Mild spinal canal stenosis.     L4-L5: Small disc bulge with small superimposed right central disc extrusion measuring approximately 6 mm in AP dimension by 7 mm in transverse dimension coursing inferiorly along the posterior aspect of L5 for a length of approximately 9 mm. This disc extrusion abuts and posteriorly displaces the right L5 nerve within the spinal canal. Moderate facet arthropathy. Ligamentum flavum thickening. Severe spinal canal stenosis. Moderate bilateral neural foraminal stenosis.     L5-S1: Small disc bulge. Moderate left and mild right facet arthropathy. Severe left and moderate right neural foraminal stenosis.     Visualized paravertebral soft tissues appear within normal limits.      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     Social history: , Has 2 children, 4 grandchildren, Finished high school, and Higher education some college denies smoking drinking or use of illicit drugs              Review of Systems   HENT: Negative.     Eyes: Negative.    Respiratory: Negative.     Cardiovascular: Negative.    Gastrointestinal: Negative.    Endocrine: Negative.    Genitourinary: Negative.    Musculoskeletal:  Positive for back pain and myalgias.   Skin: Negative.    Neurological: Negative.    Hematological: Negative.    Psychiatric/Behavioral: Negative.           Physical Exam  Vitals and nursing note reviewed.   Constitutional:       Appearance: Normal appearance.        HENT:      Head: Normocephalic and atraumatic.      Nose: Nose normal.   Eyes:      Extraocular Movements: Extraocular movements intact.      Conjunctiva/sclera: Conjunctivae normal.      Pupils: Pupils are equal, round, and reactive to light.   Cardiovascular:      Rate and Rhythm: Normal rate and regular rhythm.       Pulses: Normal pulses.      Heart sounds: Normal heart sounds.   Pulmonary:      Effort: Pulmonary effort is normal.      Breath sounds: Normal breath sounds.   Abdominal:      General: Abdomen is flat. Bowel sounds are normal.      Palpations: Abdomen is soft.   Skin:     General: Skin is warm.   Neurological:      General: No focal deficit present.      Mental Status: He is alert.      Cranial Nerves: Cranial nerves 2-12 are intact.      Sensory: Sensation is intact.      Motor: Motor function is intact.      Coordination: Coordination is intact.      Gait: Gait is intact.      Deep Tendon Reflexes: Reflexes are normal and symmetric.      Comments: Crisp reflexes in both upper and lower extremities but no clonus   Psychiatric:         Mood and Affect: Mood normal.         Behavior: Behavior normal.            Plan  At least 50% of the visit was involved in the discussion of the options for treatment. We discussed exercises, medication, interventional therapies and surgery. Healthy life style is essential with patient hard work to achieve the wellness. In addition; discussion with the patient and/or family about any of the diagnostic results, impressions and/or recommended diagnostic studies, prognosis, risks and benefits of treatment options, instructions for treatment and/or follow-up, importance of compliance with chosen treatment options, risk-factor reduction, and patient/family education.         Continue self-directed physical therapy at least daily exercises for minimum of 20-minute  Emphasized to the patient to do core exercises and stretching exercises at night and in the morning start with stretching exercises followed by core exercises  Stretching and core exercises sent to the patient chart  Repeat bilateral L3-5 MBB  Healthy lifestyle and anti-inflammatory diet in addition to weight control discussed with the patient  Alternative chronic pain therapies was discussed, encouraged and information was  handed  Return to Clinic after the injection     *Please note this report has been produced using speech recognition software and may contain errors related to that system including grammar, punctuation and spelling as well as words and phrases that may be inappropriate. If there are questions or concerns, please feel free to contact me to clarify.    Luda Drew MD

## 2025-06-12 ENCOUNTER — APPOINTMENT (OUTPATIENT)
Dept: PAIN MEDICINE | Facility: CLINIC | Age: 70
End: 2025-06-12
Payer: MEDICARE

## 2025-06-12 VITALS
HEIGHT: 70 IN | RESPIRATION RATE: 16 BRPM | BODY MASS INDEX: 30.92 KG/M2 | TEMPERATURE: 96.6 F | DIASTOLIC BLOOD PRESSURE: 86 MMHG | WEIGHT: 216 LBS | OXYGEN SATURATION: 96 % | SYSTOLIC BLOOD PRESSURE: 164 MMHG | HEART RATE: 64 BPM

## 2025-06-12 DIAGNOSIS — M43.06 LUMBAR SPONDYLOLYSIS: Primary | ICD-10-CM

## 2025-06-12 PROCEDURE — 99214 OFFICE O/P EST MOD 30 MIN: CPT | Performed by: ANESTHESIOLOGY

## 2025-06-12 PROCEDURE — 3077F SYST BP >= 140 MM HG: CPT | Performed by: ANESTHESIOLOGY

## 2025-06-12 PROCEDURE — 3008F BODY MASS INDEX DOCD: CPT | Performed by: ANESTHESIOLOGY

## 2025-06-12 PROCEDURE — 1125F AMNT PAIN NOTED PAIN PRSNT: CPT | Performed by: ANESTHESIOLOGY

## 2025-06-12 PROCEDURE — 3079F DIAST BP 80-89 MM HG: CPT | Performed by: ANESTHESIOLOGY

## 2025-06-12 PROCEDURE — 1159F MED LIST DOCD IN RCRD: CPT | Performed by: ANESTHESIOLOGY

## 2025-06-12 ASSESSMENT — ENCOUNTER SYMPTOMS
DEPRESSION: 1
LOSS OF SENSATION IN FEET: 0
OCCASIONAL FEELINGS OF UNSTEADINESS: 1

## 2025-06-12 ASSESSMENT — PAIN SCALES - GENERAL
PAINLEVEL_OUTOF10: 2
PAINLEVEL_OUTOF10: 2

## 2025-06-12 ASSESSMENT — PAIN - FUNCTIONAL ASSESSMENT: PAIN_FUNCTIONAL_ASSESSMENT: 0-10

## 2025-06-12 ASSESSMENT — PAIN DESCRIPTION - DESCRIPTORS: DESCRIPTORS: SORE

## 2025-06-12 NOTE — PROGRESS NOTES
This is 70 y.o.  male with who has been treated for Lumbosacral spondylosis without myelopathy . Pain is 90 % better, for one month . Gradually returned . The pain is described as soreness   with standing , increased daily activity and is relieved by Sitting with who is here for follow-up   Chief Complaint   Patient presents with    Follow-up     6 to 8-week follow-up       Pain Therapies: 4/28/2025 bilateral L3, L4, and L5 lumbar medial Branch Block

## 2025-06-18 DIAGNOSIS — M10.9 GOUT, UNSPECIFIED CAUSE, UNSPECIFIED CHRONICITY, UNSPECIFIED SITE: ICD-10-CM

## 2025-06-19 RX ORDER — ALLOPURINOL 100 MG/1
100 TABLET ORAL DAILY
Qty: 90 TABLET | Refills: 3 | Status: SHIPPED | OUTPATIENT
Start: 2025-06-19

## 2025-06-25 ENCOUNTER — TREATMENT (OUTPATIENT)
Dept: PHYSICAL THERAPY | Facility: CLINIC | Age: 70
End: 2025-06-25
Payer: MEDICARE

## 2025-06-25 DIAGNOSIS — M47.817 LUMBOSACRAL SPONDYLOSIS WITHOUT MYELOPATHY: ICD-10-CM

## 2025-06-25 PROCEDURE — 97110 THERAPEUTIC EXERCISES: CPT | Mod: GP

## 2025-06-25 NOTE — PROGRESS NOTES
Physical Therapy  Physical Therapy Progress Note    Patient Name Easton Ragland   MRN: [id number]  Today's Date: 6/25/2025  Time Calculation  Start Time: 1100  Stop Time: 1145  Time Calculation (min): 45 min    Insurance:    Visit #:   3   Reason for visit: Lumbosacral pain   Referred by: Dr Rajendra Banerjee MD appt:  unknown  Preferred Name:  Easton  Script:  eval and treat  Onset Date:  4/10/25  Email: [email or web address]     Therapy Diagnoses:   Problem List Items Addressed This Visit           ICD-10-CM    Lumbosacral spondylosis without myelopathy M47.817       General:   MMO, med neces, 100%, copay $30  -authorization required: no     Subjective:   Patient reports:  He saw Dr. Drew who gave written instructions on 25 exercises, he wanted patient to do new exercises.  Patient rides his bike 7 miles and compliant with HEP including stomach crunches.  Have you fallen since last visit:  no    Precautions:    Fall risk: Steadi 8, moderate fall risk.   Pain:  3-4/10  Location/Type of pain:  LB    HEP compliance/understanding:  patient reports compliance with the instructed home exercises.    Objective:   Objective Measurements:    Function:   working on putting patio set together/at times from seated position  Posture:   Gait:  Balance:   ROM:    Strength:  Flexibility:      Treatment:   **= HEP progression today NV= Next visit  np= not performed  nb= non-billable  G= group HEP= discharged to HEP  Therapeutic Exercise:     45 minutes  Nu step L3 8 min  Standing hamstring stretch 30 sec 3x **  Standing adductor stretch 30 sec 3x **  SKC 10 sec 10x 2  DKC 10 sec 10x 2 **  Supine piriformis stretch 20 sec 3x   PPTx 5 sec hold x 10**  PPT with marching 5 sec hold x 10**  Supine bridges x 10**  LTR x 10**  Small range SLR x 10 each**    Manual Therapy:       Minutes-NP  B sidely for foam roll to piriformis with pillow between knees 5 ' each side     Neuromuscular Re-education:      minutes      Modalities:       Vasopneumatic  Device       minutes  Electrical Stimulation          minutes  Ultrasound            minutes  Iontophoresis                     minutes  Cold Pack            minutes        Education:  exercise progression    Assessment:  Patient tolerated treatment well, did well with progression this date.  Added 5 new Dr. Drew's exercises which are complimentary to PT exercises and issued in writing for HEP.  No complaints of pain with new exercises.  Patient needs continued work on/skilled PT for: ROM/.flexibility /strengthening to address remaining functional, objective and subjective deficits to allow them to return to prior /optimal level of function with ADLs.  Patient is progressing with goals: compliant with HEP  Skilled care:  exercise progression/manual     Plan:    Continue to progress per poc:   NV add alt arm and leg lift on all 4s    HEP:    Access Code: [email or web address]  URL: [email or web address]  Date: 06/25/2025  Prepared by: Ramez Alarcon    Exercises  - Hooklying Single Knee to Chest Stretch  - 2 x daily - 7 x weekly - 1-2 sets - 10 reps - 5-10 hold  - Side Lunge Adductor Stretch  - 1 x daily - 7 x weekly - 1 sets - 3 reps - 30 hold  - Standing Hamstring Stretch on Chair  - 1 x daily - 7 x weekly - 1 sets - 3 reps - 30 hold  - Supine Piriformis Stretch with Foot on Ground  - 1 x daily - 7 x weekly - 1 sets - 3 reps - 30 hold  - Supine Posterior Pelvic Tilt  - 1 x daily - 7 x weekly - 3 sets - 10 reps - 5 hold  - Supine Bridge  - 1 x daily - 2 sets - 10 reps - 5 hold  - Small Range Straight Leg Raise  - 1 x daily - 7 x weekly - 3 sets - 10 reps - 5 hold  - Supine March with Gluteus Stretch on Towel Roll  - 1 x daily - 7 x weekly - 3 sets - 10 reps - 5 hold  - Supine Lower Trunk Rotation  - 1 x daily - 7 x weekly - 3 sets - 10 reps - 5 hold

## 2025-07-01 ENCOUNTER — HOSPITAL ENCOUNTER (OUTPATIENT)
Dept: PAIN MEDICINE | Facility: CLINIC | Age: 70
Discharge: HOME | End: 2025-07-01
Payer: MEDICARE

## 2025-07-01 VITALS
OXYGEN SATURATION: 94 % | HEART RATE: 82 BPM | TEMPERATURE: 98.6 F | SYSTOLIC BLOOD PRESSURE: 193 MMHG | RESPIRATION RATE: 16 BRPM | DIASTOLIC BLOOD PRESSURE: 88 MMHG

## 2025-07-01 DIAGNOSIS — M43.06 LUMBAR SPONDYLOLYSIS: ICD-10-CM

## 2025-07-01 PROCEDURE — 2500000004 HC RX 250 GENERAL PHARMACY W/ HCPCS (ALT 636 FOR OP/ED): Mod: JW | Performed by: ANESTHESIOLOGY

## 2025-07-01 PROCEDURE — 7100000009 HC PHASE TWO TIME - INITIAL BASE CHARGE

## 2025-07-01 PROCEDURE — 7100000010 HC PHASE TWO TIME - EACH INCREMENTAL 1 MINUTE

## 2025-07-01 PROCEDURE — 64494 INJ PARAVERT F JNT L/S 2 LEV: CPT | Mod: 50 | Performed by: ANESTHESIOLOGY

## 2025-07-01 PROCEDURE — 64494 INJ PARAVERT F JNT L/S 2 LEV: CPT | Performed by: ANESTHESIOLOGY

## 2025-07-01 PROCEDURE — 64493 INJ PARAVERT F JNT L/S 1 LEV: CPT | Mod: 50 | Performed by: ANESTHESIOLOGY

## 2025-07-01 PROCEDURE — 64493 INJ PARAVERT F JNT L/S 1 LEV: CPT | Performed by: ANESTHESIOLOGY

## 2025-07-01 RX ORDER — BUPIVACAINE HYDROCHLORIDE 7.5 MG/ML
INJECTION, SOLUTION EPIDURAL; RETROBULBAR AS NEEDED
Status: COMPLETED | OUTPATIENT
Start: 2025-07-01 | End: 2025-07-01

## 2025-07-01 RX ORDER — TRIAMCINOLONE ACETONIDE 40 MG/ML
INJECTION, SUSPENSION INTRA-ARTICULAR; INTRAMUSCULAR AS NEEDED
Status: COMPLETED | OUTPATIENT
Start: 2025-07-01 | End: 2025-07-01

## 2025-07-01 RX ORDER — LIDOCAINE HYDROCHLORIDE 5 MG/ML
INJECTION, SOLUTION INFILTRATION; INTRAVENOUS AS NEEDED
Status: COMPLETED | OUTPATIENT
Start: 2025-07-01 | End: 2025-07-01

## 2025-07-01 RX ORDER — LORAZEPAM 2 MG/1
2 TABLET ORAL ONCE
COMMUNITY

## 2025-07-01 RX ADMIN — TRIAMCINOLONE ACETONIDE 40 MG: 40 INJECTION, SUSPENSION INTRA-ARTICULAR; INTRAMUSCULAR at 13:50

## 2025-07-01 RX ADMIN — LIDOCAINE HYDROCHLORIDE 10 ML: 5 INJECTION, SOLUTION INFILTRATION at 13:49

## 2025-07-01 RX ADMIN — BUPIVACAINE HYDROCHLORIDE 10 ML: 7.5 INJECTION, SOLUTION EPIDURAL; RETROBULBAR at 13:50

## 2025-07-01 ASSESSMENT — PAIN SCALES - GENERAL
PAINLEVEL_OUTOF10: 5 - MODERATE PAIN
PAINLEVEL_OUTOF10: 0 - NO PAIN

## 2025-07-01 ASSESSMENT — PAIN - FUNCTIONAL ASSESSMENT: PAIN_FUNCTIONAL_ASSESSMENT: 0-10

## 2025-07-01 ASSESSMENT — PAIN DESCRIPTION - DESCRIPTORS: DESCRIPTORS: SORE

## 2025-07-02 ENCOUNTER — PATIENT MESSAGE (OUTPATIENT)
Dept: PAIN MEDICINE | Facility: CLINIC | Age: 70
End: 2025-07-02
Payer: MEDICARE

## 2025-07-02 ENCOUNTER — APPOINTMENT (OUTPATIENT)
Dept: PHYSICAL THERAPY | Facility: CLINIC | Age: 70
End: 2025-07-02
Payer: MEDICARE

## 2025-07-09 DIAGNOSIS — I10 HTN (HYPERTENSION), BENIGN: ICD-10-CM

## 2025-07-09 RX ORDER — VALSARTAN AND HYDROCHLOROTHIAZIDE 80; 12.5 MG/1; MG/1
1 TABLET, FILM COATED ORAL DAILY
Qty: 90 TABLET | Refills: 3 | Status: SHIPPED | OUTPATIENT
Start: 2025-07-09

## 2025-07-11 DIAGNOSIS — I47.10 SVT (SUPRAVENTRICULAR TACHYCARDIA): ICD-10-CM

## 2025-07-14 RX ORDER — METOPROLOL TARTRATE 25 MG/1
25 TABLET, FILM COATED ORAL 2 TIMES DAILY
Qty: 180 TABLET | Refills: 3 | Status: SHIPPED | OUTPATIENT
Start: 2025-07-14 | End: 2025-07-16

## 2025-07-15 DIAGNOSIS — I47.10 SVT (SUPRAVENTRICULAR TACHYCARDIA): ICD-10-CM

## 2025-07-16 ENCOUNTER — TREATMENT (OUTPATIENT)
Dept: PHYSICAL THERAPY | Facility: CLINIC | Age: 70
End: 2025-07-16
Payer: MEDICARE

## 2025-07-16 DIAGNOSIS — M47.817 LUMBOSACRAL SPONDYLOSIS WITHOUT MYELOPATHY: ICD-10-CM

## 2025-07-16 PROCEDURE — 97110 THERAPEUTIC EXERCISES: CPT | Mod: GP

## 2025-07-16 RX ORDER — METOPROLOL TARTRATE 25 MG/1
25 TABLET, FILM COATED ORAL 2 TIMES DAILY
Qty: 180 TABLET | Refills: 3 | Status: SHIPPED | OUTPATIENT
Start: 2025-07-16 | End: 2026-07-16

## 2025-07-16 NOTE — PROGRESS NOTES
"Physical Therapy  Physical Therapy Progress Note    Patient Name Easton Ragland   MRN: [id number]  Today's Date: 7/16/2025  Time Calculation  Start Time: 0753  Stop Time: 0840  Time Calculation (min): 47 min    Insurance:    Visit #:   4   Reason for visit: Lumbosacral pain   Referred by: Dr Rajendra Banerjee MD appt:  unknown  Preferred Name:  Easton  Script:  eval and treat  Onset Date:  4/10/25  Email: [email or web address]     Therapy Diagnoses:   Problem List Items Addressed This Visit           ICD-10-CM    Lumbosacral spondylosis without myelopathy M47.817       General:   MMO, med neces, 100%, copay $30  -authorization required: no     Subjective:   Patient reports:  The pt reports he has not been able to exercise much because he had family in from out of town. His pain level today is around 1/10.    Precautions:    Fall risk: Steadi 8, moderate fall risk.   Pain:  1/10  Location/Type of pain:  LB    HEP compliance/understanding:  patient reports compliance with the instructed home exercises.    Objective:   Objective Measurements:    Function:   working on putting patio set together/at times from seated position  Posture:   Gait:  Balance:   ROM:    Strength:  Flexibility:      Treatment:   **= HEP progression today NV= Next visit  np= not performed  nb= non-billable  G= group HEP= discharged to [facility]  Therapeutic Exercise:     43 minutes  Nu step L3 8 min  Standing hamstring stretch 30 sec 3x **  Sit to stand no armrests: 10 x 1 **  Side stepping at bar: green 5 laps **  SKC 10 sec x 5 each   Supine piriformis stretch 20 sec 3x   Supine figure 4 hip ER stretch: 20\" x 3 each   PPT with hip add:  5 sec hold x 10**  Supine bridges x 10**  LTR x 10**  Small range SLR x 10 each**    Not Performed:   Standing adductor stretch 30 sec 3x **  DKC 10 sec 10x 2 **  PPT with marching 5 sec hold x 10**      Manual Therapy:       Minutes-NP  B sidely for foam roll to piriformis with pillow between knees 5 ' each side " "    Neuromuscular Re-education:    4 minutes  Modified tandum: 20\" x 3 **    Modalities:       Vasopneumatic Device       minutes  Electrical Stimulation          minutes  Ultrasound            minutes  Iontophoresis                     minutes  Cold Pack            minutes        Education:  exercise progression    Assessment:  Patient tolerated treatment well, did well with progression this date. He was challenged by the balance exercise.  He demonstrated good core strength.   Patient needs continued work on/skilled PT for: ROM/.flexibility /strengthening to address remaining functional, objective and subjective deficits to allow them to return to prior /optimal level of function with ADLs.  Patient is progressing with goals: pain reduction, ROM, flexibility, strength, balance, HEP  Skilled care:  therapeutic exercise progression, NMR, pt education/ HEP    Plan:    Continue to progress per poc:   NV review exercises issued by Dr Drew.     HEP:    Access Code: [email or web address]  URL: [email or web address]  Date: 07/16/2025  Prepared by: Leila De Jesus    Exercises  - Hooklying Single Knee to Chest Stretch  - 2 x daily - 7 x weekly - 1-2 sets - 10 reps - 5-10 hold  - Side Lunge Adductor Stretch  - 1 x daily - 7 x weekly - 1 sets - 3 reps - 30 hold  - Standing Hamstring Stretch on Chair  - 1 x daily - 7 x weekly - 1 sets - 3 reps - 30 hold  - Supine Piriformis Stretch with Foot on Ground  - 1 x daily - 7 x weekly - 1 sets - 3 reps - 30 hold  - Supine Posterior Pelvic Tilt  - 1 x daily - 7 x weekly - 3 sets - 10 reps - 5 hold  - Supine Bridge  - 1 x daily - 2 sets - 10 reps - 5 hold  - Small Range Straight Leg Raise  - 1 x daily - 7 x weekly - 3 sets - 10 reps - 5 hold  - Supine March with Gluteus Stretch on Towel Roll  - 1 x daily - 7 x weekly - 3 sets - 10 reps - 5 hold  - Supine Lower Trunk Rotation  - 1 x daily - 7 x weekly - 3 sets - 10 reps - 5 hold  - Standing Tandem Balance with Counter Support "  - 1-2 x daily - 7 x weekly - 2-3 sets - 20-30 hold  - Sit to Stand  - 7 x weekly - 1-2 sets - 5-10 reps  - Side Stepping with Resistance at Ankles and Counter Support  - 3-4 x weekly - 1-2 sets - 10 reps

## 2025-07-28 ENCOUNTER — TREATMENT (OUTPATIENT)
Dept: PHYSICAL THERAPY | Facility: CLINIC | Age: 70
End: 2025-07-28
Payer: MEDICARE

## 2025-07-28 DIAGNOSIS — M47.817 LUMBOSACRAL SPONDYLOSIS WITHOUT MYELOPATHY: ICD-10-CM

## 2025-07-28 PROCEDURE — 97110 THERAPEUTIC EXERCISES: CPT | Mod: GP,CQ

## 2025-07-28 NOTE — PROGRESS NOTES
"Physical Therapy  Physical Therapy Progress Note    Patient Name Easton Ragland   MRN: [id number]  Today's Date: 7/28/2025  Time Calculation  Start Time: 0843  Stop Time: 0928  Time Calculation (min): 45 min    Insurance:    Visit #:   5   Reason for visit: Lumbosacral pain   Referred by: Dr Rajendra Banerjee MD appt:  unknown  Preferred Name:  Easton  Script:  eval and treat  Onset Date:  4/10/25  Email: [email or web address]     Therapy Diagnoses:   Problem List Items Addressed This Visit           ICD-10-CM    Lumbosacral spondylosis without myelopathy M47.817       General:   MMO, med neces, 100%, copay $30  -authorization required: no     Subjective:   Patient reports:  The pt reports he no pain in the morning so he arrives pain free. Does get pain during the day depending on what he is doing , tries not to do any lifting as it triggers it. Was on vacation last week where he did not do much. Brought Dr. Drew exercise sheets to review. Pt. Reports he just obtained a weighted swiss ball but has not blown it up yet.    Precautions:    Fall risk: Steadi 8, moderate fall risk.   Pain:  0/10  Location/Type of pain:  LB    HEP compliance/understanding:  patient reports compliance with the instructed home exercises.    Objective:   Objective Measurements:    Function:     Posture:   Gait:  Balance:   ROM:    Strength: blue tband for side stepping  Flexibility:  mod tightness piriformis /hamstrings     Treatment:   **= HEP progression today NV= Next visit  np= not performed  nb= non-billable  G= group HEP= discharged to [facility]  Therapeutic Exercise:     45 minutes  Nu step L3 8 min  Standing hamstring stretch 30 sec 3x **  Supine hip flexor stretch 30 sec 3x **  Sit to stand  with 10# KB 10x 15# 10x   Side stepping at bar: blue 5 laps **  SKC 10 sec x 5 each   Supine piriformis stretch 20 sec 3x   Supine figure 4 hip ER stretch: 20\" x 3 each   PPT with hip add:  5 sec hold x 10**  Supine bridges with red PB x 10**  LTR with red PB " "x 10**  Small range SLR x 10 each**    Not Performed:   Standing adductor stretch 30 sec 3x **  DKC 10 sec 10x 2 **  PPT with marching 5 sec hold x 10**      Manual Therapy:       Minutes-NP  B sidely for foam roll to piriformis with pillow between knees 5 ' each side     Neuromuscular Re-education:      minutes  Modified tandum: 20\" x 3 ** NP    Modalities:       Vasopneumatic Device       minutes  Electrical Stimulation          minutes  Ultrasound            minutes  Iontophoresis                     minutes  Cold Pack            minutes        Education:  exercise progression/reviewed RUFUS Drew sheets ,advised to emphasize flexibility with moderation and core strengthening /avoid excessive twisting /balance exercises are challenging and pt. Advised to perform as able    Assessment:  Patient tolerated treatment well, did well with progression this date. Reviewed MD exercises with education in appropriate way to perform. Increased tband to blue for side stepping as pt. Did not feel challenged until used blue. Issued for HEP. Added KB to sit stands .  Patient needs continued work on/skilled PT for: ROM/.flexibility /strengthening to address remaining functional, objective and subjective deficits to allow them to return to prior /optimal level of function with ADLs.  Patient is progressing with goals: pain reduction, ROM, flexibility, strength, balance, HEP  Skilled care:  therapeutic exercise progression, NMR, pt education/ HEP    Plan:    Continue to progress per poc:   NV progress balance exercises     HEP:    Access Code: [email or web address]  URL: [email or web address]  Date: 07/16/2025  Prepared by: Leila De Jesus    Exercises  - Hooklying Single Knee to Chest Stretch  - 2 x daily - 7 x weekly - 1-2 sets - 10 reps - 5-10 hold  - Side Lunge Adductor Stretch  - 1 x daily - 7 x weekly - 1 sets - 3 reps - 30 hold  - Standing Hamstring Stretch on Chair  - 1 x daily - 7 x weekly - 1 sets - 3 reps - 30 " hold  - Supine Piriformis Stretch with Foot on Ground  - 1 x daily - 7 x weekly - 1 sets - 3 reps - 30 hold  - Supine Posterior Pelvic Tilt  - 1 x daily - 7 x weekly - 3 sets - 10 reps - 5 hold  - Supine Bridge  - 1 x daily - 2 sets - 10 reps - 5 hold  - Small Range Straight Leg Raise  - 1 x daily - 7 x weekly - 3 sets - 10 reps - 5 hold  - Supine March with Gluteus Stretch on Towel Roll  - 1 x daily - 7 x weekly - 3 sets - 10 reps - 5 hold  - Supine Lower Trunk Rotation  - 1 x daily - 7 x weekly - 3 sets - 10 reps - 5 hold  - Standing Tandem Balance with Counter Support  - 1-2 x daily - 7 x weekly - 2-3 sets - 20-30 hold  - Sit to Stand  - 7 x weekly - 1-2 sets - 5-10 reps  - Side Stepping with Resistance at Ankles and Counter Support  - 3-4 x weekly - 1-2 sets - 10 reps

## 2025-07-30 ENCOUNTER — TREATMENT (OUTPATIENT)
Dept: PHYSICAL THERAPY | Facility: CLINIC | Age: 70
End: 2025-07-30
Payer: MEDICARE

## 2025-07-30 DIAGNOSIS — M47.817 LUMBOSACRAL SPONDYLOSIS WITHOUT MYELOPATHY: ICD-10-CM

## 2025-07-30 PROCEDURE — 97112 NEUROMUSCULAR REEDUCATION: CPT | Mod: GP

## 2025-07-30 PROCEDURE — 97110 THERAPEUTIC EXERCISES: CPT | Mod: GP

## 2025-07-30 NOTE — PROGRESS NOTES
"Physical Therapy  Physical Therapy Progress Note    Patient Name Easton Ragland   MRN: [id number]  Today's Date: 7/30/2025  Time Calculation  Start Time: 0745  Stop Time: 0835  Time Calculation (min): 50 min    Insurance:    Visit #:   6   Reason for visit: Lumbosacral pain   Referred by: Dr Rajendra Banerjee MD appt:  unknown  Preferred Name:  Easton  Script:  eval and treat  Onset Date:  4/10/25  Email: [email or web address]     Therapy Diagnoses:   Problem List Items Addressed This Visit           ICD-10-CM    Lumbosacral spondylosis without myelopathy M47.817       General:   MMO, med neces, 100%, copay $30  -authorization required: no     Subjective:   Patient reports:  The pt reports he was busy yesterday and did cleaning around the house and he did not have any pain all day.    Precautions:    Fall risk: Steadi 8, moderate fall risk.   Pain:  0/10  Location/Type of pain:  LB    HEP compliance/understanding:  patient reports compliance with the instructed home exercises.    Objective:   Objective Measurements:    Function:     Posture:   Gait:  Balance:   ROM:    Strength: tolerated progression of strengthening exercises well.   Flexibility:      Treatment:   **= HEP progression today NV= Next visit  np= not performed  nb= non-billable  G= group HEP= discharged to [facility]  Therapeutic Exercise:     35 minutes  Nu step L4 8 min subjective taken and warm up  Standing hamstring stretch 30 sec 2x **  Standing gastroc stretch: 30 sec 2x  Sit to stand  with 10# KB 10x 2  Side stepping at bar: blue 5 laps **  T-band hip 3 way: red 5 x 2 each **  SKC 10 sec x 5 each   Supine bridges with red PB x 10**  SL clamshells: red 10 x 1 each   Small range SLR x 10 each**    Not Performed:   Standing adductor stretch 30 sec 3x **  DKC 10 sec 10x 2 **  LTR with red PB x 10**  PPT with marching 5 sec hold x 10**  Supine hip flexor stretch 30 sec 3x **  Supine piriformis stretch 20 sec 3x   Supine figure 4 hip ER stretch: 20\" x 3 each   PPT " "with hip add:  5 sec hold x 10**      Manual Therapy:       Minutes-NP  B sidely for foam roll to piriformis with pillow between knees 5 ' each side NP    Neuromuscular Re-education:    15 minutes  Modified tandum: 20\" x 3 **   SLS: 2 fingers 10\" x 3 each **  Airex step overs: x 10 each     Modalities:       Vasopneumatic Device       minutes  Electrical Stimulation          minutes  Ultrasound            minutes  Iontophoresis                     minutes  Cold Pack            minutes        Education:  pt ed on therapeutic exercise technique    Assessment:  Patient tolerated treatment well, including the progression of exercises.  He continues to demonstrate improved strength and balance.   Patient needs continued work on/skilled PT for: ROM/.flexibility /strengthening to address remaining functional, objective and subjective deficits to allow them to return to prior /optimal level of function with ADLs.  Patient is progressing with goals: pain reduction, ROM, flexibility, strength, balance, HEP  Skilled care:  therapeutic exercise progression, NMR, pt education/ HEP    Plan:    Continue to progress per poc:   NV progress core/ hip strengthening exercises.     HEP:    Access Code: [email or web address]  URL: [email or web address]  Date: 07/30/2025  Prepared by: Leila De Jesus    Exercises  - Hooklying Single Knee to Chest Stretch  - 2 x daily - 7 x weekly - 1-2 sets - 10 reps - 5-10 hold  - Side Lunge Adductor Stretch  - 1 x daily - 7 x weekly - 1 sets - 3 reps - 30 hold  - Standing Hamstring Stretch on Chair  - 1 x daily - 7 x weekly - 1 sets - 3 reps - 30 hold  - Supine Piriformis Stretch with Foot on Ground  - 1 x daily - 7 x weekly - 1 sets - 3 reps - 30 hold  - Supine Posterior Pelvic Tilt  - 1 x daily - 7 x weekly - 3 sets - 10 reps - 5 hold  - Supine Bridge  - 1 x daily - 2 sets - 10 reps - 5 hold  - Small Range Straight Leg Raise  - 1 x daily - 7 x weekly - 3 sets - 10 reps - 5 hold  - Supine " March with Gluteus Stretch on Towel Roll  - 1 x daily - 7 x weekly - 3 sets - 10 reps - 5 hold  - Supine Lower Trunk Rotation  - 1 x daily - 7 x weekly - 3 sets - 10 reps - 5 hold  - Standing Tandem Balance with Counter Support  - 1-2 x daily - 7 x weekly - 2-3 sets - 20-30 hold  - Sit to Stand  - 7 x weekly - 1-2 sets - 5-10 reps  - Side Stepping with Resistance at Ankles and Counter Support  - 3-4 x weekly - 1-2 sets - 10 reps  - Single Leg Stance with Support  - 3-4 x weekly - 2-5 sets - 10-15 hold  - Standing 3-Way Leg Reach with Resistance at Ankles and Unilateral Counter Support  - 3-4 x weekly - 1-2 sets - 10 reps

## 2025-08-04 ENCOUNTER — APPOINTMENT (OUTPATIENT)
Dept: PRIMARY CARE | Facility: CLINIC | Age: 70
End: 2025-08-04
Payer: MEDICARE

## 2025-08-04 ENCOUNTER — OFFICE VISIT (OUTPATIENT)
Dept: PAIN MEDICINE | Facility: CLINIC | Age: 70
End: 2025-08-04
Payer: MEDICARE

## 2025-08-04 VITALS
BODY MASS INDEX: 30.92 KG/M2 | WEIGHT: 216 LBS | SYSTOLIC BLOOD PRESSURE: 135 MMHG | RESPIRATION RATE: 18 BRPM | OXYGEN SATURATION: 97 % | TEMPERATURE: 97.9 F | DIASTOLIC BLOOD PRESSURE: 88 MMHG | HEART RATE: 75 BPM | HEIGHT: 70 IN

## 2025-08-04 VITALS
DIASTOLIC BLOOD PRESSURE: 81 MMHG | BODY MASS INDEX: 30.92 KG/M2 | OXYGEN SATURATION: 95 % | WEIGHT: 216 LBS | HEART RATE: 78 BPM | HEIGHT: 70 IN | SYSTOLIC BLOOD PRESSURE: 168 MMHG

## 2025-08-04 DIAGNOSIS — R73.9 HYPERGLYCEMIA: ICD-10-CM

## 2025-08-04 DIAGNOSIS — M47.817 LUMBOSACRAL SPONDYLOSIS WITHOUT MYELOPATHY: Primary | ICD-10-CM

## 2025-08-04 DIAGNOSIS — E11.9 DIABETES MELLITUS WITHOUT COMPLICATION: ICD-10-CM

## 2025-08-04 DIAGNOSIS — I10 BENIGN ESSENTIAL HTN: Primary | ICD-10-CM

## 2025-08-04 PROCEDURE — 3077F SYST BP >= 140 MM HG: CPT | Performed by: FAMILY MEDICINE

## 2025-08-04 PROCEDURE — 3079F DIAST BP 80-89 MM HG: CPT | Performed by: ANESTHESIOLOGY

## 2025-08-04 PROCEDURE — 3008F BODY MASS INDEX DOCD: CPT | Performed by: FAMILY MEDICINE

## 2025-08-04 PROCEDURE — 1159F MED LIST DOCD IN RCRD: CPT | Performed by: ANESTHESIOLOGY

## 2025-08-04 PROCEDURE — 99214 OFFICE O/P EST MOD 30 MIN: CPT | Performed by: ANESTHESIOLOGY

## 2025-08-04 PROCEDURE — 3075F SYST BP GE 130 - 139MM HG: CPT | Performed by: ANESTHESIOLOGY

## 2025-08-04 PROCEDURE — 3079F DIAST BP 80-89 MM HG: CPT | Performed by: FAMILY MEDICINE

## 2025-08-04 PROCEDURE — 99214 OFFICE O/P EST MOD 30 MIN: CPT | Performed by: FAMILY MEDICINE

## 2025-08-04 PROCEDURE — 1126F AMNT PAIN NOTED NONE PRSNT: CPT | Performed by: ANESTHESIOLOGY

## 2025-08-04 PROCEDURE — 3008F BODY MASS INDEX DOCD: CPT | Performed by: ANESTHESIOLOGY

## 2025-08-04 PROCEDURE — 1159F MED LIST DOCD IN RCRD: CPT | Performed by: FAMILY MEDICINE

## 2025-08-04 RX ORDER — VALSARTAN AND HYDROCHLOROTHIAZIDE 160; 12.5 MG/1; MG/1
1 TABLET, FILM COATED ORAL DAILY
Qty: 90 TABLET | Refills: 1 | Status: SHIPPED | OUTPATIENT
Start: 2025-08-04 | End: 2026-08-04

## 2025-08-04 ASSESSMENT — ENCOUNTER SYMPTOMS
RESPIRATORY NEGATIVE: 1
MUSCULOSKELETAL NEGATIVE: 1
LOSS OF SENSATION IN FEET: 0
OCCASIONAL FEELINGS OF UNSTEADINESS: 0
CARDIOVASCULAR NEGATIVE: 1
DEPRESSION: 0
CONSTITUTIONAL NEGATIVE: 1
GASTROINTESTINAL NEGATIVE: 1

## 2025-08-04 ASSESSMENT — COLUMBIA-SUICIDE SEVERITY RATING SCALE - C-SSRS
1. IN THE PAST MONTH, HAVE YOU WISHED YOU WERE DEAD OR WISHED YOU COULD GO TO SLEEP AND NOT WAKE UP?: NO
6. HAVE YOU EVER DONE ANYTHING, STARTED TO DO ANYTHING, OR PREPARED TO DO ANYTHING TO END YOUR LIFE?: NO
2. HAVE YOU ACTUALLY HAD ANY THOUGHTS OF KILLING YOURSELF?: NO

## 2025-08-04 ASSESSMENT — PAIN - FUNCTIONAL ASSESSMENT: PAIN_FUNCTIONAL_ASSESSMENT: NO/DENIES PAIN

## 2025-08-04 ASSESSMENT — PAIN SCALES - GENERAL: PAINLEVEL_OUTOF10: 0-NO PAIN

## 2025-08-04 NOTE — PROGRESS NOTES
SUBJECTIVE:  This is 70 y.o.  male with PMH of obesity BMI 28, CAD, cardiomyopathy, PVD, DVT, history of CVA with residual effects presented initially with severe intractable lower back pain with severe right leg radiculopathy pain that started the beginning of 2025.  Patient is treated with gabapentin 300 mg x 2 x 3 daily and received left L4-5 TFESI which took away all his radicular symptoms and him having lower back pain with spondylosis which treated with bilateral L3-5 MBB who is here for follow-up stating that he feels great the back pain is minimal he has 90% relief of his symptoms.  The patient has more than medial branch block with the same excellent results we will plan on bilateral L3-5 MB RFA when patient calls and he feels need therapy.  Patient advised to continue with core exercises and stretching exercises twice daily.      Prior office visit:  6/12/2025: This is 70 y.o.  male with PMH of obesity BMI 28, CAD, cardiomyopathy, PVD, DVT, history of CVA with residual effects presented initially with severe intractable lower back pain with severe right leg radiculopathy pain that started the beginning of 2025.  Patient is treated with gabapentin 300 mg x 2 x 3 daily and received left L4-5 TFESI which took away all his radicular symptoms and him having lower back pain with spondylosis which treated with bilateral L3-5 MBB on 4/28/2025 who is here for follow-up stating he was very happy with this injection he had 90% relief for a whole month and his back pain still better than before but is getting worse especially after using it and going up and down 25 steps in his house.  Patient is only taking the gabapentin at night and he is thinking to advance it.  I explained to him about the policy of repeating the medial branch block and if he gets the same result we can proceed to radiofrequency ablation for longer pain relief and recovery.  Plan  Repeat bilateral L3-5 MBB          Procedures:   7/1/2025  bilateral L3-5 MBB patient has had 99% improvement in pain function  4/28/2025 bilateral L3-5 MBB patient has had 90% improvement in pain function  12/23/2024 right L4-5 TFESI patient has had 100% improvement in pain function     Portions of record reviewed for pertinent issues: active problem list, medication list, allergies, family history, social history, notes from last encounter, encounters, lab results, imaging and other available records.     I have personally reviewed the [facility] report for this patient. This report is scanned into the electronic medical record. I have considered the risks of abuse, dependence, addiction and diversion. It showed: Oxycodone 5 mg from PCP  OPIOID RISK ASSESSMENT SCORE 0/26  Aberrant behavior: None  My patient has no underlying substance abuse or alcohol abuse and there's no mental health conditions contributing to the patient's pain.           Diagnostic studies:  11/20/2024 MRI of the lumbar spine did not show any bone marrow edema or endplate change there is multiple degenerative changes with facet hypertrophy but significant disc herniation to the right at the L4-5 affecting the right L5 nerve root:  Findings:     The conus medullaris ends normally. The alignment of the lumbar spine is anatomic.     The vertebral body heights are well maintained. There is no aggressive bone marrow signal abnormality. Heterogeneity of the bone marrow likely secondary to osteopenia/osteoporosis.     Disc desiccation throughout the lumbar spine. Mild intervertebral disc height loss throughout the lumbar spine.     L1-L2: Small disc bulge. No neural foraminal or spinal canal stenosis.     L2-L3: Small disc bulge. No neuroforaminal or spinal canal stenosis.     L3-L4: Small disc bulge. Mild facet arthropathy. Mild bilateral neuroforaminal stenosis. Mild spinal canal stenosis.     L4-L5: Small disc bulge with small superimposed right central disc extrusion measuring approximately 6 mm in AP  dimension by 7 mm in transverse dimension coursing inferiorly along the posterior aspect of L5 for a length of approximately 9 mm. This disc extrusion abuts and posteriorly displaces the right L5 nerve within the spinal canal. Moderate facet arthropathy. Ligamentum flavum thickening. Severe spinal canal stenosis. Moderate bilateral neural foraminal stenosis.     L5-S1: Small disc bulge. Moderate left and mild right facet arthropathy. Severe left and moderate right neural foraminal stenosis.     Visualized paravertebral soft tissues appear within normal limits.      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     Social history: , Has 2 children, 4 grandchildren, Finished high school, and Higher education some college denies smoking drinking or use of illicit drugs              Review of Systems   HENT: Negative.     Eyes: Negative.    Respiratory: Negative.     Cardiovascular: Negative.    Gastrointestinal: Negative.    Endocrine: Negative.    Genitourinary: Negative.    Musculoskeletal:  Positive for back pain and myalgias.   Skin: Negative.    Neurological: Negative.    Hematological: Negative.    Psychiatric/Behavioral: Negative.           Physical Exam  Vitals and nursing note reviewed.   Constitutional:       Appearance: Normal appearance.        HENT:      Head: Normocephalic and atraumatic.      Nose: Nose normal.   Eyes:      Extraocular Movements: Extraocular movements intact.      Conjunctiva/sclera: Conjunctivae normal.      Pupils: Pupils are equal, round, and reactive to light.   Cardiovascular:      Rate and Rhythm: Normal rate and regular rhythm.      Pulses: Normal pulses.      Heart sounds: Normal heart sounds.   Pulmonary:      Effort: Pulmonary effort is normal.      Breath sounds: Normal breath sounds.   Abdominal:      General: Abdomen is flat. Bowel sounds are normal.      Palpations: Abdomen is  soft.   Skin:     General: Skin is warm.   Neurological:      General: No focal deficit present.      Mental Status: He is alert.      Cranial Nerves: Cranial nerves 2-12 are intact.      Sensory: Sensation is intact.      Motor: Motor function is intact.      Coordination: Coordination is intact.      Gait: Gait is intact.      Deep Tendon Reflexes: Reflexes are normal and symmetric.      Comments: Crisp reflexes in both upper and lower extremities but no clonus   Psychiatric:         Mood and Affect: Mood normal.         Behavior: Behavior normal.             Plan  At least 50% of the visit was involved in the discussion of the options for treatment. We discussed exercises, medication, interventional therapies and surgery. Healthy life style is essential with patient hard work to achieve the wellness. In addition; discussion with the patient and/or family about any of the diagnostic results, impressions and/or recommended diagnostic studies, prognosis, risks and benefits of treatment options, instructions for treatment and/or follow-up, importance of compliance with chosen treatment options, risk-factor reduction, and patient/family education.         Continue self-directed physical therapy at least daily exercises for minimum of 20-minute  Plan for bilateral L3-5 MB RFA when patient calls  Healthy lifestyle and anti-inflammatory diet in addition to weight control discussed with the patient  Alternative chronic pain therapies was discussed, encouraged and information was handed  Return to Clinic 3 to 6 months     *Please note this report has been produced using speech recognition software and may contain errors related to that system including grammar, punctuation and spelling as well as words and phrases that may be inappropriate. If there are questions or concerns, please feel free to contact me to clarify.    Luda Drew MD

## 2025-08-04 NOTE — PROGRESS NOTES
"This is 70 y.o.  male with who has been treated for Lumbar spondylosis . Pain is 90 % better, The pain is described as discomfort alleviated by tens unit . Here for follow-up   Chief Complaint   Patient presents with    Follow-up       Pain Therapies: 6 to 8-week follow-up  7/1/2025 bilateral L3, L4, and L5 lumbar medial Branch Block        Per pain diary :\"1 hour-pain-0, no medication, laid in bed. 2hrs-pain-1, soreness by injection site, no meds, took a nap. 4hrs - pain 1-2 still soreness by  injection site. no meds, watched TV. 8hrs- pain 2-3 , more soreness by injection site. took 600mg's of Gabapentin, and 1200mg's of   Ibuprofen, and went to sleep (very rough night sleeping). Overall 99% relief of actual back pain. Thanks, Easton\". Per patient .      "

## 2025-08-04 NOTE — PROGRESS NOTES
Subjective   Patient ID: Easton Ragland is a 70 y.o. male who presents for Follow-up (A1C).  HPI  Htn hyperglycemia  Is getting cortisone shots in back    Review of Systems   Constitutional: Negative.    HENT: Negative.     Respiratory: Negative.     Cardiovascular: Negative.    Gastrointestinal: Negative.    Genitourinary: Negative.    Musculoskeletal: Negative.        Objective   Physical Exam  Constitutional:       Appearance: Normal appearance.   HENT:      Head: Normocephalic.      Mouth/Throat:      Mouth: Mucous membranes are moist.     Cardiovascular:      Rate and Rhythm: Normal rate and regular rhythm.   Pulmonary:      Effort: Pulmonary effort is normal.      Breath sounds: Normal breath sounds.     Musculoskeletal:         General: Normal range of motion.     Neurological:      Mental Status: He is alert.         Assessment/Plan   Problem List Items Addressed This Visit    None  Visit Diagnoses         Codes      Benign essential HTN    -  Primary I10    Relevant Medications    valsartan-hydrochlorothiazide (Diovan HCT) 160-12.5 mg tablet    Other Relevant Orders    Comprehensive Metabolic Panel    Lipid Panel    Hemoglobin A1C      Hyperglycemia     R73.9    Relevant Medications    valsartan-hydrochlorothiazide (Diovan HCT) 160-12.5 mg tablet    Other Relevant Orders    Comprehensive Metabolic Panel    Lipid Panel    Hemoglobin A1C                 Paolo Zhu DO 08/04/25 9:37 AM

## 2025-08-06 ENCOUNTER — TREATMENT (OUTPATIENT)
Dept: PHYSICAL THERAPY | Facility: CLINIC | Age: 70
End: 2025-08-06
Payer: MEDICARE

## 2025-08-06 DIAGNOSIS — M47.817 LUMBOSACRAL SPONDYLOSIS WITHOUT MYELOPATHY: ICD-10-CM

## 2025-08-06 PROCEDURE — 97112 NEUROMUSCULAR REEDUCATION: CPT | Mod: GP

## 2025-08-06 PROCEDURE — 97110 THERAPEUTIC EXERCISES: CPT | Mod: GP

## 2025-08-06 NOTE — PROGRESS NOTES
"Physical Therapy  Physical Therapy Progress Note    Patient Name Easton Ragland   MRN: [id number]  Today's Date: 8/6/2025  Time Calculation  Start Time: 0750  Stop Time: 0835  Time Calculation (min): 45 min    Insurance:    Visit #:   7   Reason for visit: Lumbosacral pain   Referred by: Dr Rajendra Banerjee MD appt:  unknown  Preferred Name:  Easton  Script:  eval and treat  Onset Date:  4/10/25  Email: [email or web address]     Therapy Diagnoses:   Problem List Items Addressed This Visit           ICD-10-CM    Lumbosacral spondylosis without myelopathy M47.817       General:   MMO, med neces, 100%, copay $30  -authorization required: no     Subjective:   Patient reports:  The pt reports he was fixing his toilet yesterday and he had increased pain from that. He may have to work on that today also. The pain is in his central back.  He saw Dr Drew Monday and he is going to schedule radiofrequency ablation treatment and he recommended walking sticks for balance. He ordered walking sticks.     Precautions:    Fall risk: Steadi 8, moderate fall risk.   Pain:  5/10  Location/Type of pain:  LB    HEP compliance/understanding:  patient reports compliance with the instructed home exercises.    Objective:   Objective Measurements:    Function:     Posture:   Gait:  Balance:   ROM:  increased tightness in the back with stretches / ROM this session.   Strength:   Flexibility:      Treatment:   **= HEP progression today NV= Next visit  np= not performed  nb= non-billable  G= group HEP= discharged to HEP  Therapeutic Exercise:     35 minutes  Nu step L4 8 min subjective taken and warm up  Standing hamstring stretch 30 sec 3x **  Standing gastroc stretch: 30 sec 2x  Seated swiss ball stretch: 10\" x 3 each   Sit to stand  with 10# KB 10x 2  Side stepping at bar: blue 5 laps **  Seated adductor stretch 30 sec 2x **    Not Performed:   T-band hip 3 way: red 5 x 2 each **  SKC 10 sec x 5 each   Supine bridges with red PB x 10**  SL " "clamshells: red 10 x 1 each   Small range SLR x 10 each**  DKC 10 sec 10x 2 **  LTR with red PB x 10**  PPT with marching 5 sec hold x 10**  Supine hip flexor stretch 30 sec 3x **  Supine piriformis stretch 20 sec 3x   Supine figure 4 hip ER stretch: 20\" x 3 each   PPT with hip add:  5 sec hold x 10**      Manual Therapy:       Minutes-NP  B sidely for foam roll to piriformis with pillow between knees 5 ' each side NP    Neuromuscular Re-education:    10 minutes  Modified tandum: 20\" x 3 **   Airex step overs: x 10 each   T-band 3 way pulldowns: red 10 x 1 each   Seated DLS with hip add: 5\" x 10     Modalities:       Vasopneumatic Device       minutes  Electrical Stimulation          minutes  Ultrasound            minutes  Iontophoresis                     minutes  Cold Pack            minutes        Education:  pt ed on therapeutic exercise technique    Assessment:  The pt tolerated the focus mobility and pain reduction well this session.  Pt advised to focus on this with the HEP until his pain has decreased and then he can reintroduce more of the strengthening exercises.   Patient needs continued work on/skilled PT for: ROM/.flexibility /strengthening to address remaining functional, objective and subjective deficits to allow them to return to prior /optimal level of function with ADLs.  Patient is progressing with goals: pain reduction, ROM, flexibility, strength, balance, HEP  Skilled care:  therapeutic exercise progression, NMR, pt education/ HEP    Plan:    Continue to progress per poc:   NV progress core/ hip strengthening exercises as tolerated.     HEP:    Access Code: [email or web address]  URL: [email or web address]  Date: 07/30/2025  Prepared by: Leila De Jesus    Exercises  - Hooklying Single Knee to Chest Stretch  - 2 x daily - 7 x weekly - 1-2 sets - 10 reps - 5-10 hold  - Side Lunge Adductor Stretch  - 1 x daily - 7 x weekly - 1 sets - 3 reps - 30 hold  - Standing Hamstring Stretch on Chair "  - 1 x daily - 7 x weekly - 1 sets - 3 reps - 30 hold  - Supine Piriformis Stretch with Foot on Ground  - 1 x daily - 7 x weekly - 1 sets - 3 reps - 30 hold  - Supine Posterior Pelvic Tilt  - 1 x daily - 7 x weekly - 3 sets - 10 reps - 5 hold  - Supine Bridge  - 1 x daily - 2 sets - 10 reps - 5 hold  - Small Range Straight Leg Raise  - 1 x daily - 7 x weekly - 3 sets - 10 reps - 5 hold  - Supine March with Gluteus Stretch on Towel Roll  - 1 x daily - 7 x weekly - 3 sets - 10 reps - 5 hold  - Supine Lower Trunk Rotation  - 1 x daily - 7 x weekly - 3 sets - 10 reps - 5 hold  - Standing Tandem Balance with Counter Support  - 1-2 x daily - 7 x weekly - 2-3 sets - 20-30 hold  - Sit to Stand  - 7 x weekly - 1-2 sets - 5-10 reps  - Side Stepping with Resistance at Ankles and Counter Support  - 3-4 x weekly - 1-2 sets - 10 reps  - Single Leg Stance with Support  - 3-4 x weekly - 2-5 sets - 10-15 hold  - Standing 3-Way Leg Reach with Resistance at Ankles and Unilateral Counter Support  - 3-4 x weekly - 1-2 sets - 10 reps

## 2025-08-11 ENCOUNTER — TREATMENT (OUTPATIENT)
Dept: PHYSICAL THERAPY | Facility: CLINIC | Age: 70
End: 2025-08-11
Payer: MEDICARE

## 2025-08-11 DIAGNOSIS — M47.817 LUMBOSACRAL SPONDYLOSIS WITHOUT MYELOPATHY: ICD-10-CM

## 2025-08-11 PROCEDURE — 97110 THERAPEUTIC EXERCISES: CPT | Mod: GP

## 2025-08-11 PROCEDURE — 97112 NEUROMUSCULAR REEDUCATION: CPT | Mod: GP

## 2025-08-11 ASSESSMENT — BALANCE ASSESSMENTS
STANDING UNSUPPORTED ONE FOOT IN FRONT: ABLE TO PLACE FOOT TANDEM INDEPENDENTLY AND HOLD 30 SECONDS
TURN 360 DEGREES: ABLE TO TURN 360 DEGREES SAFELY IN 4 SECONDS OR LESS
PICK UP OBJECT FROM THE FLOOR FROM A STANDING POSITION: ABLE TO PICK UP SLIPPER SAFELY AND EASILY
TRANSFERS: ABLE TO TRANSFER SAFELY WITH MINOR USE OF HANDS
LONG VERSION TOTAL SCORE (MAX 56): 54
STANDING UNSUPPORTED WITH FEET TOGETHER: ABLE TO PLACE FEET TOGETHER INDEPENDENTLY AND STAND 1 MINUTE SAFELY
STANDING ON ONE LEG: ABLE TO LIFT LEG INDEPENDENTLY AND HOLD 5-10 SECONDS
STANDING UNSUPPORTED: ABLE TO STAND SAFELY FOR 2 MINUTES
PLACE ALTERNATE FOOT ON STEP OR STOOL WHILE STANDING UNSUPPORTED: ABLE TO STAND INDEPENDENTLY AND COMPLETE 8 STEPS IN GREATER THAN 20 SECONDS
SITTING WITH BACK UNSUPPORTED BUT FEET SUPPORTED ON FLOOR OR ON A STOOL: ABLE TO SIT SAFELY AND SECURELY FOR 2 MINUTES
PLACE ALTERNATE FOOT ON STEP OR STOOL WHILE STANDING UNSUPPORTED: LOOKS BEHIND FROM BOTH SIDES AND WEIGHT SHIFTS WELL
STANDING TO SITTING: SITS SAFELY WITH MINIMAL USE OF HANDS
STANDING UNSUPPORTED WITH EYES CLOSED: ABLE TO STAND 10 SECONDS SAFELY
REACHING FORWARD WITH OUTSTRETCHED ARM WHILE STANDING: CAN REACH FORWARD CONFIDENTLY 25 CM (10 INCHES)
STANDING TO SITTING: ABLE TO STAND WITHOUT USING HANDS AND STABILIZE INDEPENDENTLY

## 2025-08-13 ENCOUNTER — TREATMENT (OUTPATIENT)
Dept: PHYSICAL THERAPY | Facility: CLINIC | Age: 70
End: 2025-08-13
Payer: MEDICARE

## 2025-08-13 DIAGNOSIS — M47.817 LUMBOSACRAL SPONDYLOSIS WITHOUT MYELOPATHY: ICD-10-CM

## 2025-08-13 PROCEDURE — 97112 NEUROMUSCULAR REEDUCATION: CPT | Mod: GP,CQ

## 2025-08-13 PROCEDURE — 97110 THERAPEUTIC EXERCISES: CPT | Mod: GP,CQ

## 2025-08-18 ENCOUNTER — APPOINTMENT (OUTPATIENT)
Dept: PHYSICAL THERAPY | Facility: CLINIC | Age: 70
End: 2025-08-18
Payer: MEDICARE

## 2025-08-18 DIAGNOSIS — M47.817 LUMBOSACRAL SPONDYLOSIS WITHOUT MYELOPATHY: ICD-10-CM

## 2025-08-18 PROCEDURE — 97110 THERAPEUTIC EXERCISES: CPT | Mod: GP

## 2026-05-14 ENCOUNTER — APPOINTMENT (OUTPATIENT)
Dept: PRIMARY CARE | Facility: CLINIC | Age: 71
End: 2026-05-14
Payer: MEDICARE